# Patient Record
Sex: FEMALE | Race: WHITE | Employment: FULL TIME | URBAN - METROPOLITAN AREA
[De-identification: names, ages, dates, MRNs, and addresses within clinical notes are randomized per-mention and may not be internally consistent; named-entity substitution may affect disease eponyms.]

---

## 2020-02-17 NOTE — PROGRESS NOTES
Chief Complaint No chief complaint on file. [facility] Katty Almaguer is a 20 y.o. female who presents for the evaluation of ***. No LMP recorded. The patient complains of ***. It is located ***. The symptoms are ***. They started *** ago. Since then they have become ***. Associated symptoms: ***. Aggravating symptoms: ***. Alleviating factors: ***. The patient denies ***. No past medical history on file. No past surgical history on file. Social History Occupational History  Not on file Tobacco Use  Smoking status: Not on file Substance and Sexual Activity  Alcohol use: Not on file  Drug use: Not on file  Sexual activity: Not on file No family history on file. Allergies not on file Prior to Admission medications Not on File Review of Systems: History obtained from the patient Constitutional: negative for weight loss, fever, night sweats HEENT: negative for hearing loss, earache, congestion, snoring, sorethroat CV: negative for chest pain, palpitations, edema Resp: negative for cough, shortness of breath, wheezing Breast: negative for breast lumps, nipple discharge, galactorrhea GI: negative for change in bowel habits, abdominal pain, black or bloody stools : negative for frequency, dysuria, hematuria, vaginal discharge MSK: negative for back pain, joint pain, muscle pain Skin: negative for itching, rash, hives Neuro: negative for dizziness, headache, confusion, weakness Psych: negative for anxiety, depression, change in mood Heme/lymph: negative for bleeding, bruising, pallor Objective: There were no vitals taken for this visit. Physical Exam: PHYSICAL EXAMINATION Constitutional 
· Appearance: well-nourished, well developed, alert, in no acute distress HENT 
· Head and Face: appears normal 
 
Neck · Inspection/Palpation: normal appearance, no masses or tenderness · Lymph Nodes: no lymphadenopathy present · Thyroid: gland size normal, nontender, no nodules or masses present on palpation Chest 
· Respiratory Effort: breathing labored · Auscultation: normal breath sounds Cardiovascular · Heart: 
· Auscultation: regular rate and rhythm without murmur Breasts · Inspection of Breasts: breasts symmetrical, no skin changes, no discharge present, nipple appearance normal, no skin retraction present · Palpation of Breasts and Axillae: no masses present on palpation, no breast tenderness · Axillary Lymph Nodes: no lymphadenopathy present Gastrointestinal 
· Abdominal Examination: abdomen non-tender to palpation, normal bowel sounds, no masses present · Liver and spleen: no hepatomegaly present, spleen not palpable · Hernias: no hernias identified Genitourinary · External Genitalia: normal appearance for age, no discharge present, no tenderness present, no inflammatory lesions present, no masses present, no atrophy present · Vagina: normal vaginal vault without central or paravaginal defects, no discharge present, no inflammatory lesions present, no masses present · Bladder: non-tender to palpation · Urethra: appears normal 
· Cervix: normal  
· Uterus: normal size, shape and consistency · Adnexa: no adnexal tenderness present, no adnexal masses present · Perineum: perineum within normal limits, no evidence of trauma, no rashes or skin lesions present · Anus: anus within normal limits, no hemorrhoids present · Inguinal Lymph Nodes: no lymphadenopathy present Skin · General Inspection: no rash, no lesions identified Neurologic/Psychiatric · Mental Status: · Orientation: grossly oriented to person, place and time · Mood and Affect: mood normal, affect appropriate Assessment:  
 
 
Plan:  
 
 
 
RTO prn if symptoms persist or worsen. Instructions given to pt. Handouts given to pt.

## 2020-02-18 ENCOUNTER — OFFICE VISIT (OUTPATIENT)
Dept: OBGYN CLINIC | Age: 20
End: 2020-02-18

## 2020-02-18 ENCOUNTER — PATIENT MESSAGE (OUTPATIENT)
Dept: OBGYN CLINIC | Age: 20
End: 2020-02-18

## 2020-02-18 VITALS — DIASTOLIC BLOOD PRESSURE: 80 MMHG | SYSTOLIC BLOOD PRESSURE: 124 MMHG | WEIGHT: 187 LBS

## 2020-02-18 DIAGNOSIS — N93.9 ABNORMAL UTERINE BLEEDING (AUB): Primary | ICD-10-CM

## 2020-02-18 RX ORDER — NORGESTIMATE AND ETHINYL ESTRADIOL 0.25-0.035
1 KIT ORAL DAILY
COMMUNITY
End: 2020-11-17 | Stop reason: SDUPTHER
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
## 2020-02-19 LAB
ERYTHROCYTE [DISTWIDTH] IN BLOOD BY AUTOMATED COUNT: 12.9 % (ref 11.7–15.4)
FERRITIN SERPL-MCNC: 7 NG/ML (ref 15–150)
HCG INTACT+B SERPL-ACNC: <1 MIU/ML
HCT VFR BLD AUTO: 36.7 % (ref 34–46.6)
HGB BLD-MCNC: 11.7 G/DL (ref 11.1–15.9)
IRON SATN MFR SERPL: 5 % (ref 15–55)
IRON SERPL-MCNC: 19 UG/DL (ref 27–159)
MCH RBC QN AUTO: 26.7 PG (ref 26.6–33)
MCHC RBC AUTO-ENTMCNC: 31.9 G/DL (ref 31.5–35.7)
MCV RBC AUTO: 84 FL (ref 79–97)
PLATELET # BLD AUTO: 316 X10E3/UL (ref 150–450)
RBC # BLD AUTO: 4.39 X10E6/UL (ref 3.77–5.28)
TIBC SERPL-MCNC: 395 UG/DL (ref 250–450)
TSH SERPL DL<=0.005 MIU/L-ACNC: 1.45 UIU/ML (ref 0.45–4.5)
UIBC SERPL-MCNC: 376 UG/DL (ref 131–425)
WBC # BLD AUTO: 7.5 X10E3/UL (ref 3.4–10.8)

## 2020-02-24 LAB
C TRACH RRNA SPEC QL NAA+PROBE: NEGATIVE
N GONORRHOEA RRNA SPEC QL NAA+PROBE: NEGATIVE
T VAGINALIS DNA SPEC QL NAA+PROBE: NEGATIVE

## 2020-02-24 NOTE — PROGRESS NOTES
Chief Complaint   Follow Up Appointment (AUB)      HPI  Deerk Ramachandran is a 20 y.o. female who presents for the evaluation of abnormal uterine bleeding. Patient's last menstrual period was 02/22/2020. On Sprintec. Seen for first visit 2/18/20. Given Menstrual calendar, did not bring with her today. On placebo week with current pill pack. Period-like bleeding started on Saturday 2/22, last day of active pills. Thought period had stopped, but some spotting today. Due to start new pack this weekend. Labs drawn last visit:  Results for orders placed or performed in visit on 02/18/20   TSH 3RD GENERATION   Result Value Ref Range    TSH 1.450 0.450 - 4.500 uIU/mL   CBC W/O DIFF   Result Value Ref Range    WBC 7.5 3.4 - 10.8 x10E3/uL    RBC 4.39 3.77 - 5.28 x10E6/uL    HGB 11.7 11.1 - 15.9 g/dL    HCT 36.7 34.0 - 46.6 %    MCV 84 79 - 97 fL    MCH 26.7 26.6 - 33.0 pg    MCHC 31.9 31.5 - 35.7 g/dL    RDW 12.9 11.7 - 15.4 %    PLATELET 042 254 - 879 x10E3/uL   FERRITIN   Result Value Ref Range    Ferritin 7 (L) 15 - 150 ng/mL   IRON PROFILE   Result Value Ref Range    TIBC 395 250 - 450 ug/dL    UIBC 376 131 - 425 ug/dL    Iron 19 (L) 27 - 159 ug/dL    Iron % saturation 5 (LL) 15 - 55 %   BETA HCG, QT   Result Value Ref Range    HCG, beta, QT <1 mIU/mL   CT/NG/T.VAGINALIS AMPLIFICATION   Result Value Ref Range    C. trachomatis by LA Negative Negative    N. gonorrhoeae by LA Negative Negative    T. vaginalis by LA Negative Negative         US today shows focal density, ?polyp, otherwise nl US with thin endometrium. TRANSVAGINAL ULTRASOUND PERFORMED  UTERUS IS ANTEVERTED, NORMAL IN SIZE AND ECHOGENICITY. ENDOMETRIUM MEASURES 4MM IN THICKNESS. A 8MM X 5MM ECHOGENIC FOCI IS NOTED IN THE  ENDOMETRIUM. BLOOD FLOW CAN NOT BE SEEN TO THIS FOCI. A POLYP CAN NOT BE RULED OUT. BILATERAL FOLLICULAR CYSTS ALL <2CW. RIGHT OVARY APPEARS WITHIN NORMAL LIMITS. LEFT OVARY APPEARS WITHIN NORMAL LIMITS.   SCANT FREE FLUID SEEN IN THE CDS. Past Medical History:   Diagnosis Date    ADD (attention deficit disorder)     Depression with anxiety      No past surgical history on file. Social History     Occupational History    Not on file   Tobacco Use    Smoking status: Not on file   Substance and Sexual Activity    Alcohol use: Not on file    Drug use: Not on file    Sexual activity: Not on file     Family History   Problem Relation Age of Onset    Hypertension Father     Heart Disease Maternal Grandmother     Kidney Disease Maternal Grandmother     Uterine Cancer Maternal Grandmother     Hypertension Maternal Grandmother     Heart Disease Maternal Grandfather     Hypertension Maternal Grandfather     Stroke Maternal Grandfather     Hypertension Paternal Grandmother     Heart Attack Paternal Grandfather                Allergies   Allergen Reactions    Clindamycin Hives    Penicillins Hives     Prior to Admission medications    Medication Sig Start Date End Date Taking? Authorizing Provider   norgestimate-ethinyl estradioL ([id number] [facility], ,) 0.25-35 mg-mcg tab Take 1 Tab by mouth daily.     Provider, Historical            Objective:  Visit Vitals  /71 (BP 1 Location: Right arm, BP Patient Position: Sitting)   Ht 5' 4\" (1.626 m)   Wt 187 lb (84.8 kg)   LMP 2020   BMI 32.10 kg/m²       Physical Exam:   PHYSICAL EXAMINATION    Constitutional  · Appearance: well-nourished, well developed, alert, in no acute distress    HENT  · Head and Face: appears normal      Skin  · General Inspection: no rash, no lesions identified    Neurologic/Psychiatric  · Mental Status:  · Orientation: grossly oriented to person, place and time  · Mood and Affect: mood normal, affect appropriate    Assessment:   BTB on OCPs  ?endometrial polyp on US    Plan:   SIS  Enc to track bleeding on menstrual calendar

## 2020-02-24 NOTE — TELEPHONE ENCOUNTER
----- Message from Tank Pineda MD sent at 2/23/2020 10:59 PM EST -----  Thyroid is normal.  You are not anemic, but your iron levels are a little low. Please take any over the counter iron supplement once a day or every other day. Notify pt.

## 2020-02-26 ENCOUNTER — OFFICE VISIT (OUTPATIENT)
Dept: OBGYN CLINIC | Age: 20
End: 2020-02-26

## 2020-02-26 VITALS
BODY MASS INDEX: 31.92 KG/M2 | HEIGHT: 64 IN | SYSTOLIC BLOOD PRESSURE: 122 MMHG | DIASTOLIC BLOOD PRESSURE: 71 MMHG | WEIGHT: 187 LBS

## 2020-02-26 DIAGNOSIS — R93.5 ABNORMAL ENDOMETRIAL ULTRASOUND: ICD-10-CM

## 2020-02-26 DIAGNOSIS — N92.1 BREAKTHROUGH BLEEDING ON OCPS: Primary | ICD-10-CM

## 2020-03-03 ENCOUNTER — OFFICE VISIT (OUTPATIENT)
Dept: OBGYN CLINIC | Age: 20
End: 2020-03-03

## 2020-03-03 DIAGNOSIS — N92.1 BREAKTHROUGH BLEEDING ON OCPS: Primary | ICD-10-CM

## 2020-03-03 NOTE — PROGRESS NOTES
Abnormal bleeding note      Lynette Cerda is a 20 y.o. female who complains of vaginal bleeding problems. BTB on Sprintec    Her current method of family planning is OCP (estrogen/progesterone). She developed this problem approximately 2 months ago. Seen for initial visit on 20. Reported irregular bleeding throughout :  -10  1/20---    She has had vaginal bleeding which she describes as heavy, irregular lasting up to several days. Pad or tampon count: changes every 1-2 hours. Reports regular cycles prior to January. Seen  for US. Endometrium with focal irregularity, cannot r/o polyp. Was on placebo week at that visit. Reported period started -. Returns today for SIS -> wnl (see separate note below)    Had reported taking pills consistently. However, on further questioning today, states she started pills on Saturday because it is a [employer]. Does not consistently start new pack every 28 days. Will vary start date depending on when her period finishes. Her relevant past medical history:   Past Medical History:   Diagnosis Date    ADD (attention deficit disorder)     Depression with anxiety         No past surgical history on file.   Social History     Occupational History    Not on file   Tobacco Use    Smoking status: Not on file   Substance and Sexual Activity    Alcohol use: Not on file    Drug use: Not on file    Sexual activity: Not on file     Family History   Problem Relation Age of Onset    Hypertension Father     Heart Disease Maternal Grandmother     Kidney Disease Maternal Grandmother     Uterine Cancer Maternal Grandmother     Hypertension Maternal Grandmother     Heart Disease Maternal Grandfather     Hypertension Maternal Grandfather     Stroke Maternal Grandfather     Hypertension Paternal Grandmother     Heart Attack Paternal Grandfather                Allergies   Allergen Reactions  Clindamycin Hives    Penicillins Hives     Prior to Admission medications    Medication Sig Start Date End Date Taking? Authorizing Provider   norgestimate-ethinyl estradioL ([id number], ,) 0.25-35 mg-mcg tab Take 1 Tab by mouth daily. Provider, Historical        Review of Systems - History obtained from the patient  Constitutional: negative for weight loss, fever, night sweats  HEENT: negative for hearing loss, earache, congestion, snoring, sorethroat  CV: negative for chest pain, palpitations, edema  Resp: negative for cough, shortness of breath, wheezing  Breast: negative for breast lumps, nipple discharge, galactorrhea  GI: negative for change in bowel habits, abdominal pain, black or bloody stools  : negative for frequency, dysuria, hematuria  MSK: negative for back pain, joint pain, muscle pain  Skin: negative for itching, rash, hives  Neuro: negative for dizziness, headache, confusion, weakness  Psych: negative for anxiety, depression, change in mood  Heme/lymph: negative for bleeding, bruising, pallor      Objective:    Visit Vitals  LMP 02/22/2020          PHYSICAL EXAMINATION    Constitutional  · Appearance: well-nourished, well developed, alert, in no acute distress    HENT  · Head and Face: appears normal        Skin  · General Inspection: no rash, no lesions identified    Neurologic/Psychiatric  · Mental Status:  · Orientation: grossly oriented to person, place and time  · Mood and Affect: mood normal, affect appropriate    Assessment:   BTB on OCPs. ? polyp on previous US -> SIS nl today. On further questioning, sounds like she has not been starting her new packs appropriately    Plan:   Reviewed proper way to take pills. Continue menstrual calendar  RTO 3-4 months for AE, will reassess bleeding pattern at that visit. Instructions given to pt. Handouts given to pt.                       [facility]  OFFICE PROCEDURE PROGRESS NOTE        Chart reviewed for the following:   Toni Davis MD, have reviewed the History, Physical and updated the Allergic reactions for [address] performed immediately prior to start of procedure:   Toni Davis MD, have performed the following reviews on [facility] prior to the start of the procedure:            * Patient was identified by name and date of birth   * Agreement on procedure being performed was verified  * Risks and Benefits explained to the patient  * Procedure site verified and marked as necessary  * Patient was positioned for comfort  * Consent was signed and verified     Time: 1125      Date of procedure: 3/3/2020    Procedure performed by:  Michael Reno MD    Provider assisted by: Yasir Zuluaga MA    Patient assisted by: self    How tolerated by patient: tolerated the procedure well with no complications    Post Procedural Pain Scale: 2 - Hurts Little Bit    Comments: none          SONOHYSTEROGRAPHY    [facility] is a No obstetric history on file. ,  20 y.o. female [facility] whose Patient's last menstrual period was 02/22/2020. was on . , presents for a sonohysterography. The indications for this procedure were reviewed with the patient. The procedure was explained in detail and all questions were answered. Procedure: The patient was placed in the lithotomy position. A graves speculum was introduced into the vagina and the cervix was visualized. The cervix was prepped with zephiran solution. A Cook's Hysterography catheter was then introduced into the uterine cavity and the speculum was removed. Sterile sonohysterography with 3D Reconstruction was performed. The endometrial cavity was distended with sterile saline. ~5-7cc used  The findings are as follows: normal cavity with no lesions. The patient tolerated the procedure well without complication, and was discharged to home.

## 2020-06-01 ENCOUNTER — OFFICE VISIT (OUTPATIENT)
Dept: INTERNAL MEDICINE CLINIC | Age: 20
End: 2020-06-01

## 2020-06-01 VITALS
HEART RATE: 83 BPM | RESPIRATION RATE: 12 BRPM | DIASTOLIC BLOOD PRESSURE: 77 MMHG | OXYGEN SATURATION: 99 % | SYSTOLIC BLOOD PRESSURE: 122 MMHG | HEIGHT: 64 IN | BODY MASS INDEX: 31.24 KG/M2 | WEIGHT: 183 LBS | TEMPERATURE: 98.1 F

## 2020-06-01 DIAGNOSIS — Z00.00 ROUTINE ADULT HEALTH MAINTENANCE: ICD-10-CM

## 2020-06-01 DIAGNOSIS — G47.01 INSOMNIA DUE TO MEDICAL CONDITION: ICD-10-CM

## 2020-06-01 DIAGNOSIS — F41.9 ANXIETY: Primary | ICD-10-CM

## 2020-06-01 PROBLEM — E61.1 IRON DEFICIENCY: Status: ACTIVE | Noted: 2020-06-01

## 2020-06-01 PROBLEM — F33.40 RECURRENT MAJOR DEPRESSIVE DISORDER, IN REMISSION (HCC): Status: ACTIVE | Noted: 2020-06-01

## 2020-06-01 RX ORDER — TRAZODONE HYDROCHLORIDE 50 MG/1
50 TABLET ORAL
Qty: 30 TAB | Refills: 2 | Status: SHIPPED | OUTPATIENT
Start: 2020-06-01 | End: 2020-08-24 | Stop reason: SDUPTHER

## 2020-06-01 RX ORDER — LANOLIN ALCOHOL/MO/W.PET/CERES
CREAM (GRAM) TOPICAL
COMMUNITY
End: 2020-11-17

## 2020-06-01 NOTE — PROGRESS NOTES
Assessment and Plan   Diagnoses and all orders for this visit:    1. Anxiety  2. Insomnia due to medical condition  -     traZODone (DESYREL) 50 mg tablet; Take 1 Tab by mouth nightly.  -     REFERRAL TO BEHAVIORAL HEALTH  Symptoms mostly worse at night when she wakes up in the middle of the night. Will trial trazodone. Can take up to 2 tablets at night. Also recommend therapy to discuss other behavioral modifications. Discussed sleep hygiene    3. Routine adult health maintenance  Reports getting all of her vaccines when she was younger. Benefits, risks, possible drug interactions, and side effects of all new medications were reviewed with the patient. Pt verbalized understanding. Return to clinic: 3 to 6 months for anxiety/insomnia, Pap    Amy Mosley MD  [facility]  6/1/2020    Future Appointments   Date Time Provider Celia Dora   7/6/2020 11:00 AM Gilbert Taylor MD [address] [place] [address]        History of Present Illness   Chief Complaint   Anxiety    Rick Wagner is a 20 y.o. female     Previously followed by pediatrician    Anxiety, insomnia, depression previously diagnosed with depression and anxiety and was on medications however was having side effects so she stopped. Reports depression has been well controlled but her anxiety has gotten worse over the past couple months. Reports that she wakes up at night and is unable to go back to sleep. Unable to shut off her thoughts at night. She wakes up afraid of family members dying. She is able to fall asleep okay but when she wakes up, she cannot fall asleep again. Wakes up around 3:00. Feels she is able to cope with her anxiety better during the day and is mostly an issue at night when she wakes up. Iron deficiency anemiahas been taking iron supplements    Review of Systems   Constitutional: Negative for chills and fever. HENT: Negative for hearing loss.     Eyes: Negative for blurred vision. Respiratory: Negative for shortness of breath. Cardiovascular: Negative for chest pain. Gastrointestinal: Negative for abdominal pain, blood in stool, constipation, diarrhea, melena, nausea and vomiting. Genitourinary: Negative for dysuria and hematuria. Musculoskeletal: Negative for joint pain. Skin: Negative for rash. Neurological: Negative for headaches. Past Medical History     Allergies   Allergen Reactions    Clindamycin Hives    Penicillins Hives        Current Outpatient Medications   Medication Sig    traZODone (DESYREL) 50 mg tablet Take 1 Tab by mouth nightly.  ferrous sulfate 325 mg (65 mg iron) tablet Take  by mouth Daily (before breakfast).  norgestimate-ethinyl estradioL ([facility], 28,) 0.25-35 mg-mcg tab Take 1 Tab by mouth daily. No current facility-administered medications for this visit. Patient Active Problem List   Diagnosis Code    Depression with anxiety F41.8    ADD (attention deficit disorder) F98.8    Anxiety F41.9     History reviewed. No pertinent surgical history.    Social History     Tobacco Use    Smoking status: Never Smoker    Smokeless tobacco: Former User    Tobacco comment: vaping   Substance Use Topics    Alcohol use: Never     Frequency: Never      Family History   Problem Relation Age of Onset    Hypertension Father     Heart Disease Maternal Grandmother     Kidney Disease Maternal Grandmother     Uterine Cancer Maternal Grandmother     Hypertension Maternal Grandmother     Heart Disease Maternal Grandfather     Hypertension Maternal Grandfather     Stroke Maternal Grandfather     Hypertension Paternal Grandmother     Heart Attack Paternal Grandfather                 Physical Exam   Vitals:       Visit Vitals  /77 (BP 1 Location: Right arm, BP Patient Position: Sitting)   Pulse 83   Temp 98.1 °F (36.7 °C) (Oral)   Resp 12   Ht 5' 4\" (1.626 m)   Wt 183 lb (83 kg)   LMP 2020 (Approximate) SpO2 99%   BMI 31.41 kg/m²        Physical Exam  Constitutional:       General: She is not in acute distress. Appearance: She is well-developed. HENT:      Right Ear: Tympanic membrane, ear canal and external ear normal.      Left Ear: Tympanic membrane, ear canal and external ear normal.      Nose: Nose normal.   Eyes:      Conjunctiva/sclera: Conjunctivae normal.      Pupils: Pupils are equal, round, and reactive to light. Neck:      Musculoskeletal: Neck supple. Cardiovascular:      Rate and Rhythm: Normal rate and regular rhythm. Pulses: Normal pulses. Heart sounds: No murmur. No friction rub. No gallop. Pulmonary:      Effort: No respiratory distress. Breath sounds: No wheezing or rales. Abdominal:      General: Bowel sounds are normal. There is no distension. Palpations: Abdomen is soft. There is no hepatomegaly, splenomegaly or mass. Tenderness: There is no abdominal tenderness. Hernia: No hernia is present. Skin:     General: Skin is warm. Findings: No rash. Neurological:      Mental Status: She is alert and oriented to person, place, and time. Gait: Gait normal.   Psychiatric:         Mood and Affect: Mood normal.         Thought Content:  Thought content normal.         Judgment: Judgment normal.          Voice recognition software is utilized and this note may contain transcription errors

## 2020-06-18 ENCOUNTER — DOCUMENTATION ONLY (OUTPATIENT)
Dept: INTERNAL MEDICINE CLINIC | Age: 20
End: 2020-06-18

## 2020-06-18 ENCOUNTER — VIRTUAL VISIT (OUTPATIENT)
Dept: INTERNAL MEDICINE CLINIC | Age: 20
End: 2020-06-18

## 2020-06-18 DIAGNOSIS — F41.1 GENERALIZED ANXIETY DISORDER: Primary | ICD-10-CM

## 2020-06-18 NOTE — PROGRESS NOTES
[facility],    It was good to meet with you today! Rocky attached some information for you to look over:  - A breathing exercise  - The Mood Disorder Questionnaire    Here is a link to an excellent article on ADHD and the brain:  Sukhbe    Here is a link for a 3 minute moving meditation:  [email or web address]  And here is the link to the mindfulness website I like with many free videos:  [employer].gl    Below is an explanation of how ADHD/Anxiety/Bipolar can have similar symptoms, which is why I want to check for bipolar symptoms with the Mood Disorder Questionnaire, just in case:  Bipolar disorder is characterized by high, euphoric, or irritable periods called rena and low periods of depression. The rena stage is sometimes mistaken for hyperactivity and the low states manifest themselves as inattention and lack of motivation, which are common in individuals with ADHD. Depressive Symptoms  The depressive symptoms of bipolar disorder include:   Very low mood state   A loss of interest in things that previously gave pleasure   Dysregulation of appetite (either increase or decrease)   Significant weight loss or gain   Change in sleeping habits (sleeping much more than normal or sleeping too little)   Altered physical agitation rate (slowing down or becoming more anxious)   Feelings of fatigue, worthlessness, or inappropriate guilt   Difficulty concentrating   Recurrent thoughts of death or suicide  How Depressive Symptoms Resemble ADHD  The ADHD brain produces an insufficient amount of dopamine, the neurotransmitter that helps control to brains reward and pleasure centers. As such, it naturally craves more dopamine, which sugar and carbohydrates deliver in spades.  Feeding these cravings may result in the appetite changes, weight gain, and sleep problems described above. In addition, people with ADHD report frequent sleep disturbances and problems falling asleep due to a racing brain. Inattentiveness and difficulty concentrating may cause fatigue, especially among students and full-time employees working long days. Distractibility and poor focus can cause people with ADHD to quickly lose interest in activities or objects that once gave them pleasure as well. The principle difference is that ADHD symptoms are contextual and situational, whereas bipolar symptoms are not triggered by any external factors. In a stimulating and positive environment, people with ADHD remain in a good internal state. If the environment is not stimulating enough, they can become bored or agitated. People with ADHD always experience life in this manner. Their sensibility and orientation toward the world is the same, even when they move through mood states of different intensities. Patients with ADHD can more often control their feelings by changing their environment and stimuli. Depressive bipolar symptoms, on the other hand, can feel like a dark cloud emerging from an internal emotional state. No situational reasons trigger these feelings of depression; patients just wake up feeling fundamentally different when depressed and not depressed. Manic Symptoms  Viry is a severe change in mood during which a person with bipolar disorder is either extremely irritable or overly silly/elated.  Manic symptoms include:   [place]-inflated self-esteem, grandiosity   Increased, revved-up energy   Decreased need for sleep for up to a week without feeling tired   Extreme random distractibility   Racing thoughts   Increased monetary spending   Extreme irritability/inability to settle down   Talking too much or too fast, changing topics quickly   Unusual hypersexuality   Increased goal-directed activity   Disregard of risk, excessive involvement in risky behaviors or illegal activities  How Manic Symptoms Resemble ADHD  One hallmark byproduct of ADHD is hyperfocus, or the ability to focus intently on something of great personal interest for an extended period of time, at times mentally drowning out the world around. This may happen on deadline pressure or when wrapped up in a compelling project, book, or video game. Hyperfocus may cause a decreased need for sleep and may look like increased goal-directed activity, however this is short-lived in people with ADHD, who often feel exhausted once the hyperfocus fades. A manic episode, on the other hand, is independent of external circumstances. People with bipolar disorder often want to go to sleep or relax, but describe feeling as if there is electricity going through their bodies that they cannot stop or dampen, no matter how desperate they are for sleep. This inability to settle the mind and body can go on for a week. Going without sleep for long periods of time can trigger psychotic episodes or hallucinations. People with ADHD often interrupt or talk too much without noticing because they miss social cues or because they lose focus on the threads of a conversation. Patients experiencing a manic bipolar episode are often aware they are changing topics quickly and sometimes randomly, but they feel powerless to stop or understand their quickly moving thoughts. This type of behavior is uncharacteristic and does not reflect how they would typically converse. People with ADHD report racing thoughts, which they can grasp and appreciate but cant necessarily express or record quickly enough. With rena, the patients racing thoughts flash by like a flock of birds overtaking them so fast that their color and type is impossible to discern. These distracting and disconcerting racing thoughts are often mistaken for ADHD, though they are quite different in nature. During a manic episode, a patient can engage in risky sexual or illegal behavior. They may also become incredibly productive for a few days, before exhaustion causes them to physically and mentally crash. Bipolar II/Hypomanic Symptoms  Bipolar II affects 1-2 percent of the population and its symptoms include:   Hypomanic episodes marked by anxiety   Rejection sensitivity  The hypomania experienced by people with bipolar II is less intense than other manic episodes, and is easily mistaken for anxiety. During these episodes, patients feel revved up, or like their thinking is different than it normally is, which often resembles regular hyperactivity. People with bipolar II have a high rejection sensitivity, a trait common to people with ADHD as well. In addition, people with bipolar II experience more depressive episodes than do people with bipolar I disorder. Bipolar II is more common in women, and can be misdiagnosed as major depressive disorder because the rena is less severe. There is also a type of bipolar spectrum disorder that doesnt fit neatly into manic or depressive episodes called NOS (not otherwise specified). In the course of a day, people with NOS might experience both rena and depressive symptoms. Its a lot of info, so just take it at your own pace! Let me know if you have any questions.       Jolynn Bradford
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
## 2020-07-02 ENCOUNTER — VIRTUAL VISIT (OUTPATIENT)
Dept: INTERNAL MEDICINE CLINIC | Age: 20
End: 2020-07-02

## 2020-07-02 DIAGNOSIS — F41.1 GENERALIZED ANXIETY DISORDER: Primary | ICD-10-CM

## 2020-07-02 NOTE — PROGRESS NOTES
Behavioral Health Progress Note    Date :  07/02/20   Name: Lisbeth Forte   Session Length: 30  Type of visit:  Virtual, with pt at home and [facility] in home office. MENTAL STATUS:  Lexie presents with bright affect, said things are going well. She has been staying busy, making herself get up and do things, less \"in her head,\" and feeling better. Mood has improved, less anxious, decreased angry outbursts. RISK ASSESSMENT:   Patient denies Suicidal Ideation/Homicidal Ideation/Self-Injury Behavior. ASSESSMENT AND INTERVENTION:    Pt has been going to the gym with friends, talking more with her friends and her family, and feeling better. She picked her classes for Fall semester, continues to work, and in general has been more active which always makes her feel better. Feels like she has a schedule and purpose to her days. Talked about having the ability to self-schedule, which is important. She has been journaling and doing yoga with her friends, will go to the beach for a few days with family. Reviewed MDQ, pt answered yes to 7 out of 13 to Part A, which is a positive result, however, they do not happen at the same time, causes only a moderate problem. At current time pt does not meet criteria for bipolar, her symptoms seems more influenced by her ADHD and by what pt is thinking about at the time. Pt is more active now, which is good, but has not really changed her thinking as much. But she is feeling better, so decided to end sessions and she will call as needed. PLAN:    Pt will continue with her activities and self-care, will call if she feels she needs help.

## 2020-08-24 DIAGNOSIS — G47.01 INSOMNIA DUE TO MEDICAL CONDITION: ICD-10-CM

## 2020-08-24 RX ORDER — TRAZODONE HYDROCHLORIDE 50 MG/1
50 TABLET ORAL
Qty: 30 TAB | Refills: 2 | Status: SHIPPED | OUTPATIENT
Start: 2020-08-24 | End: 2020-12-20

## 2020-11-11 NOTE — PROGRESS NOTES
Assessment and Plan   Diagnoses and all orders for this visit:    1. Attention deficit hyperactivity disorder (ADHD), combined type  -     lisdexamfetamine (Vyvanse) 30 mg capsule; Take 1 Cap by mouth every morning. Max Daily Amount: 30 mg. Indications: attention deficit disorder with hyperactivity  -     lisdexamfetamine (Vyvanse) 30 mg capsule; Take 1 Cap by mouth every morning. Max Daily Amount: 30 mg. Indications: attention deficit disorder with hyperactivity  Diagnosed with ADHD has a child and has been on medication since age 5. Previously on Adderall but she did not like how she felt on it. Subsequently on Concerta but that made her anxiety worse. She is currently going through online school and has a lot of distractions at home which is making it more difficult for her to concentrate. She is interested in starting medications again for ADHD. We will try Vyvanse. Advised to call if she has any issues with this medication. We will follow-up in a month    2. Anxiety  3. Insomnia due to medical condition  Trazodone 50 mg at night working well for her and symptoms are better. Benefits, risks, possible drug interactions, and side effects of all new medications were reviewed with the patient. Pt verbalized understanding. Return to clinic: 1 month for medication follow-up,     Nidhi Ramos MD  [facility]  11/12/2020    Future Appointments   Date Time Provider Celia Nievesi   11/17/2020 10:20 AM Candy Casper MD [facility] BS AMB   [id number]/33/[id number]  0:06 AM Estee Coy MD [facility] BS AMB        Subjective   Chief Complaint   ADHD    Albertina Rutledge is a 23 y.o. female         Review of Systems   Constitutional: Negative for chills and fever. Respiratory: Negative for shortness of breath. Cardiovascular: Negative for chest pain.         Objective   Vitals:       Visit Vitals  /76 (BP 1 Location: Left arm, BP Patient Position: Sitting) Pulse 78   Temp 98.3 °F (36.8 °C) (Oral)   Resp 12   Ht 5' 4\" (1.626 m)   Wt 177 lb 6.4 oz (80.5 kg)   LMP 10/12/2020 (Approximate)   SpO2 98%   BMI 30.45 kg/m²        Physical Exam  Constitutional:       Appearance: Normal appearance. She is not ill-appearing. Cardiovascular:      Rate and Rhythm: Normal rate and regular rhythm. Heart sounds: No murmur. No friction rub. No gallop. Pulmonary:      Effort: No respiratory distress. Breath sounds: No wheezing, rhonchi or rales. Neurological:      Mental Status: She is alert. Gait: Gait normal.   Psychiatric:         Mood and Affect: Mood normal.         Thought Content: Thought content normal.         Judgment: Judgment normal.          Current Outpatient Medications   Medication Sig    lisdexamfetamine (Vyvanse) 30 mg capsule Take 1 Cap by mouth every morning. Max Daily Amount: 30 mg. Indications: attention deficit disorder with hyperactivity    [START ON 12/12/2020] lisdexamfetamine (Vyvanse) 30 mg capsule Take 1 Cap by mouth every morning. Max Daily Amount: 30 mg. Indications: attention deficit disorder with hyperactivity    traZODone (DESYREL) 50 mg tablet Take 1 Tab by mouth nightly.  norgestimate-ethinyl estradioL ([facility]PRINT[facility], 28,) 0.25-35 mg-mcg tab Take 1 Tab by mouth daily.  ferrous sulfate 325 mg (65 mg iron) tablet Take  by mouth Daily (before breakfast). Indications: Not Taking     No current facility-administered medications for this visit.

## 2020-11-12 ENCOUNTER — OFFICE VISIT (OUTPATIENT)
Dept: INTERNAL MEDICINE CLINIC | Age: 20
End: 2020-11-12

## 2020-11-12 VITALS
HEIGHT: 64 IN | OXYGEN SATURATION: 98 % | DIASTOLIC BLOOD PRESSURE: 76 MMHG | SYSTOLIC BLOOD PRESSURE: 126 MMHG | RESPIRATION RATE: 12 BRPM | TEMPERATURE: 98.3 F | HEART RATE: 78 BPM | WEIGHT: 177.4 LBS | BODY MASS INDEX: 30.29 KG/M2

## 2020-11-12 DIAGNOSIS — G47.01 INSOMNIA DUE TO MEDICAL CONDITION: ICD-10-CM

## 2020-11-12 DIAGNOSIS — F90.2 ATTENTION DEFICIT HYPERACTIVITY DISORDER (ADHD), COMBINED TYPE: Primary | ICD-10-CM

## 2020-11-12 DIAGNOSIS — F41.9 ANXIETY: ICD-10-CM

## 2020-11-12 PROCEDURE — 99214 OFFICE O/P EST MOD 30 MIN: CPT | Performed by: INTERNAL MEDICINE

## 2020-11-16 NOTE — PROGRESS NOTES
Annual exam ages 20-44    Maylin Wing is a No obstetric history on file. ,  23 y.o. female [facility] [place] Patient's last menstrual period was 11/12/2020., who presents for her annual checkup. LV=3/3/20 f/u OCPs  Taking classes online classes through [employer]. Was supposed to be studying abroad ([place], [facility]), canceled d/t COVID pandemic. Today is her first gyn exam.     Will be starting Vyvanse today. H/o AUB. Sprintec. At last visit in March, was not taking pills properly, reviewed correct timing. Has not had any BTB since then. ADDITIONAL CONCERNS:  She is having no significant problems. With regard to the Gardasil vaccine, she has received all 3 injections. Menstrual status:    Her periods are heavy in flow. She is using five to ten pads or tampons per day, usually regular and last 26-30 days. She denies dysmenorrhea. She denies premenstrual symptoms. Contraception:    The current method of family planning is OCP (estrogen/progesterone). Sexual history:     She  reports previously being sexually active and has had partner(s) who are Male. .        Pap and Mammogram History: Today is her first pap smear. The patient has never had a mammogram.    Breast Cancer History/Substance Abuse:    Past Medical History:   Diagnosis Date    ADD (attention deficit disorder)     Anxiety     Depression with anxiety      History reviewed. No pertinent surgical history. OB History   No obstetric history on file. Current Outpatient Medications   Medication Sig Dispense Refill    norgestimate-ethinyl estradioL (Sprintec, 28,) 0.25-35 mg-mcg tab Take 1 Tab by mouth daily. 3 Package 4    [START ON 12/12/2020] lisdexamfetamine (Vyvanse) 30 mg capsule Take 1 Cap by mouth every morning. Max Daily Amount: 30 mg. Indications: attention deficit disorder with hyperactivity 30 Cap 0    traZODone (DESYREL) 50 mg tablet Take 1 Tab by mouth nightly.  30 Tab 2    ferrous sulfate 325 mg (65 mg iron) tablet Take  by mouth Daily (before breakfast). Indications: Not Taking       Allergies: Clindamycin and Penicillins   Social History     Socioeconomic History    Marital status: SINGLE     Spouse name: Not on file    Number of children: Not on file    Years of education: Not on file    Highest education level: Not on file   Occupational History    Not on file   Social Needs    Financial resource strain: Not on file    Food insecurity     Worry: Not on file     Inability: Not on file    Transportation needs     Medical: Not on file     Non-medical: Not on file   Tobacco Use    Smoking status: Never Smoker    Smokeless tobacco: Former User    Tobacco comment: vaping   Substance and Sexual Activity    Alcohol use: Never     Frequency: Never    Drug use: Yes     Types: Marijuana     Comment: marijuana daily    Sexual activity: Not Currently     Partners: Male   Lifestyle    Physical activity     Days per week: Not on file     Minutes per session: Not on file    Stress: Not on file   Relationships    Social connections     Talks on phone: Not on file     Gets together: Not on file     Attends Latter day service: Not on file     Active member of club or organization: Not on file     Attends meetings of clubs or organizations: Not on file     Relationship status: Not on file    Intimate partner violence     Fear of current or ex partner: Not on file     Emotionally abused: Not on file     Physically abused: Not on file     Forced sexual activity: Not on file   Other Topics Concern    Not on file   Social History Narrative    Not on file     Tobacco History:  reports that she has never smoked. She quit smokeless tobacco use about a year ago. Alcohol Abuse:  reports no history of alcohol use. Drug Abuse:  reports current drug use. Drug: Marijuana.     Patient Active Problem List   Diagnosis Code    ADD (attention deficit disorder) F98.8    Anxiety F41.9    Recurrent major depressive disorder, in remission ([facility] F33.40    Iron deficiency E61.1     Family History   Problem Relation Age of Onset    Hypertension Father     Heart Disease Maternal Grandmother     Kidney Disease Maternal Grandmother     Uterine Cancer Maternal Grandmother     Hypertension Maternal Grandmother     Heart Disease Maternal Grandfather     Hypertension Maternal Grandfather     Stroke Maternal Grandfather     Hypertension Paternal Grandmother     Heart Attack Paternal Grandfather                Review of Systems - History obtained from the patient  Constitutional: negative for weight loss, fever, night sweats  HEENT: negative for hearing loss, earache, congestion, snoring, sorethroat  CV: negative for chest pain, palpitations, edema  Resp: negative for cough, shortness of breath, wheezing  GI: negative for change in bowel habits, abdominal pain, black or bloody stools  : negative for frequency, dysuria, hematuria, vaginal discharge  MSK: negative for back pain, joint pain, muscle pain  Breast: negative for breast lumps, nipple discharge, galactorrhea  Skin :negative for itching, rash, hives  Neuro: negative for dizziness, headache, confusion, weakness  Psych: negative for anxiety, depression, change in mood  Heme/lymph: negative for bleeding, bruising, pallor    Physical Exam    Visit Vitals  /80   Wt 180 lb (81.6 kg)   LMP 2020   BMI 30.90 kg/m²       Constitutional  · Appearance: well-nourished, well developed, alert, in no acute distress    HENT  · Head and Face: appears normal    Neck  · Inspection/Palpation: normal appearance, no masses or tenderness  · Lymph Nodes: no lymphadenopathy present  · Thyroid: gland size normal, nontender, no nodules or masses present on palpation    Chest  · Respiratory Effort: breathing unlabored  · Auscultation: normal breath sounds    Cardiovascular  · Heart:  · Auscultation: regular rate and rhythm without murmur    Breasts  · Inspection of Breasts: breasts symmetrical, no skin changes, no discharge present, nipple appearance normal, no skin retraction present  · Palpation of Breasts and Axillae: no masses present on palpation, no breast tenderness  · Axillary Lymph Nodes: no lymphadenopathy present    Gastrointestinal  · Abdominal Examination: abdomen non-tender to palpation, normal bowel sounds, no masses present  · Liver and spleen: no hepatomegaly present, spleen not palpable  · Hernias: no hernias identified    Genitourinary  · External Genitalia: normal appearance for age, no discharge present, no tenderness present, no inflammatory lesions present, no masses present, no atrophy present  · Vagina: normal vaginal vault without central or paravaginal defects, no discharge present, no inflammatory lesions present, no masses present; menstrual blood  · Bladder: non-tender to palpation  · Urethra: appears normal  · Cervix: normal   · Uterus: normal size, shape and consistency  · Adnexa: no adnexal tenderness present, no adnexal masses present  · Perineum: perineum within normal limits, no evidence of trauma, no rashes or skin lesions present  · Anus: anus within normal limits, no hemorrhoids present  · Inguinal Lymph Nodes: no lymphadenopathy present    Skin  · General Inspection: no rash, no lesions identified    Neurologic/Psychiatric  · Mental Status:  · Orientation: grossly oriented to person, place and time  · Mood and Affect: mood normal, affect appropriate            Assessment & Plan:  · Routine gynecologic examination. Pap today  · STI screening <26yo. · H/o AUB. Sprintec. Doing well. eRX #3 RFx4  · Her current medical status is satisfactory with no evidence of significant gynecologic issues.   · Counseled re: diet, exercise, healthy lifestyle  · Return for yearly wellness visits  · Gardisil completed  · Patient verbalized understanding       Orders Placed This Encounter    norgestimate-ethinyl estradioL ([address] [facility], ,) 0.25-35 mg-mcg tab Sig: Take 1 Tab by mouth daily. Dispense:  3 Package     Refill:  4    PAP(IMAGE GUIDED) CHLAMYDIA/GC,W/REFL HPV ALL PATHOLOGY([id number]) ([facility])     Order Specific Question:   Pap Source? Answer:   Cervical and Endocervical     Order Specific Question:   Total Hysterectomy? Answer:   No     Order Specific Question:   Supracervical Hysterectomy? Answer:   No     Order Specific Question:   Post Menopausal?     Answer:   No     Order Specific Question:   Hormone Therapy? Answer:   No     Order Specific Question:   IUD? Answer:   No     Order Specific Question:   Abnormal Bleeding? Answer:   No     Order Specific Question:   Pregnant     Answer:   No     Order Specific Question:   Post Partum? Answer:    No

## 2020-11-17 ENCOUNTER — OFFICE VISIT (OUTPATIENT)
Dept: OBGYN CLINIC | Age: 20
End: 2020-11-17
Payer: COMMERCIAL

## 2020-11-17 VITALS — SYSTOLIC BLOOD PRESSURE: 123 MMHG | BODY MASS INDEX: 30.9 KG/M2 | DIASTOLIC BLOOD PRESSURE: 80 MMHG | WEIGHT: 180 LBS

## 2020-11-17 DIAGNOSIS — Z01.419 ENCOUNTER FOR GYNECOLOGICAL EXAMINATION: Primary | ICD-10-CM

## 2020-11-17 PROCEDURE — 99395 PREV VISIT EST AGE 18-39: CPT | Performed by: OBSTETRICS & GYNECOLOGY

## 2020-11-17 RX ORDER — NORGESTIMATE AND ETHINYL ESTRADIOL 0.25-0.035
1 KIT ORAL DAILY
Qty: 3 PACKAGE | Refills: 4 | Status: SHIPPED | OUTPATIENT
Start: 2020-11-17 | End: 2021-11-19 | Stop reason: SDUPTHER

## 2020-11-26 LAB
C TRACH RRNA CVX QL NAA+PROBE: NEGATIVE
CYTOLOGIST CVX/VAG CYTO: NORMAL
CYTOLOGY CVX/VAG DOC CYTO: NORMAL
CYTOLOGY CVX/VAG DOC THIN PREP: NORMAL
DX ICD CODE: NORMAL
LABCORP, 190119: NORMAL
Lab: NORMAL
Lab: NORMAL
N GONORRHOEA RRNA CVX QL NAA+PROBE: NEGATIVE
OTHER STN SPEC: NORMAL
STAT OF ADQ CVX/VAG CYTO-IMP: NORMAL

## 2020-12-02 PROBLEM — Z12.4 ROUTINE PAPANICOLAOU SMEAR: Status: ACTIVE | Noted: 2020-12-02

## 2020-12-10 NOTE — PROGRESS NOTES
Assessment and Plan   Diagnoses and all orders for this visit:    1. Attention deficit hyperactivity disorder (ADHD), combined type  -     lisdexamfetamine (Vyvanse) 30 mg capsule; Take 1 Cap by mouth every morning. Max Daily Amount: 30 mg. Indications: attention deficit disorder with hyperactivity  -     lisdexamfetamine (Vyvanse) 30 mg capsule; Take 1 Cap by mouth every morning. Max Daily Amount: 30 mg. Indications: attention deficit disorder with hyperactivity  -     lisdexamfetamine (Vyvanse) 30 mg capsule; Take 1 Cap by mouth every morning. Max Daily Amount: 30 mg. Indications: attention deficit disorder with hyperactivity  Medication working well for her. Denies any worsening sleep issues, appetite issues, chest pain, palpitations. Says the medication has been helping her focus more. Continue current dose    2. Encounter for immunization  -     TETANUS, DIPHTHERIA TOXOIDS AND ACELLULAR PERTUSSIS VACCINE (TDAP), IN INDIVIDS. >=7, IM    3. Needs flu shot  -     INFLUENZA VIRUS VAC QUAD,SPLIT,PRESV FREE SYRINGE IM    We will check records to see if she had the HPV vaccine. Previously followed by [facility] pediatrics    Benefits, risks, possible drug interactions, and side effects of all new medications were reviewed with the patient. Pt verbalized understanding. Return to clinic: 6 months for medication follow-up    Mayelin Matos MD  [facility]  12/11/2020    Future Appointments   Date Time Provider Celia Nievesi   11/18/2021  9:00 AM Freeman Tucker MD [facility] BS AMB        Subjective   Chief Complaint   Medication follow-up    Ian Soto is a 23 y.o. female         Review of Systems   Constitutional: Negative for chills and fever. Respiratory: Negative for shortness of breath. Cardiovascular: Negative for chest pain.         Objective   Vitals:       Visit Vitals  /73 (BP 1 Location: Left arm, BP Patient Position: Sitting)   Pulse 84   Temp 97.8 °F (36.6 °C) (Oral)   Resp 16   Ht 5' 4\" (1.626 m)   Wt 183 lb (83 kg)   LMP 11/12/2020   SpO2 99%   BMI 31.41 kg/m²        Physical Exam  Constitutional:       Appearance: Normal appearance. She is not ill-appearing. Cardiovascular:      Rate and Rhythm: Normal rate. Pulmonary:      Effort: No respiratory distress. Neurological:      Mental Status: She is alert. Gait: Gait normal.   Psychiatric:         Mood and Affect: Mood normal.         Thought Content: Thought content normal.         Judgment: Judgment normal.          Current Outpatient Medications   Medication Sig    [START ON 1/12/2021] lisdexamfetamine (Vyvanse) 30 mg capsule Take 1 Cap by mouth every morning. Max Daily Amount: 30 mg. Indications: attention deficit disorder with hyperactivity    [START ON 2/12/2021] lisdexamfetamine (Vyvanse) 30 mg capsule Take 1 Cap by mouth every morning. Max Daily Amount: 30 mg. Indications: attention deficit disorder with hyperactivity    [START ON 3/12/2021] lisdexamfetamine (Vyvanse) 30 mg capsule Take 1 Cap by mouth every morning. Max Daily Amount: 30 mg. Indications: attention deficit disorder with hyperactivity    norgestimate-ethinyl estradioL (Sprintec, 28,) 0.25-35 mg-mcg tab Take 1 Tab by mouth daily.  traZODone (DESYREL) 50 mg tablet Take 1 Tab by mouth nightly. No current facility-administered medications for this visit.

## 2020-12-11 ENCOUNTER — TELEPHONE (OUTPATIENT)
Dept: INTERNAL MEDICINE CLINIC | Age: 20
End: 2020-12-11

## 2020-12-11 ENCOUNTER — OFFICE VISIT (OUTPATIENT)
Dept: INTERNAL MEDICINE CLINIC | Age: 20
End: 2020-12-11
Payer: COMMERCIAL

## 2020-12-11 VITALS
DIASTOLIC BLOOD PRESSURE: 73 MMHG | HEIGHT: 64 IN | BODY MASS INDEX: 31.24 KG/M2 | RESPIRATION RATE: 16 BRPM | OXYGEN SATURATION: 99 % | SYSTOLIC BLOOD PRESSURE: 111 MMHG | TEMPERATURE: 97.8 F | WEIGHT: 183 LBS | HEART RATE: 84 BPM

## 2020-12-11 DIAGNOSIS — Z23 NEEDS FLU SHOT: ICD-10-CM

## 2020-12-11 DIAGNOSIS — Z23 ENCOUNTER FOR IMMUNIZATION: ICD-10-CM

## 2020-12-11 DIAGNOSIS — F90.2 ATTENTION DEFICIT HYPERACTIVITY DISORDER (ADHD), COMBINED TYPE: Primary | ICD-10-CM

## 2020-12-11 PROCEDURE — 90715 TDAP VACCINE 7 YRS/> IM: CPT | Performed by: INTERNAL MEDICINE

## 2020-12-11 PROCEDURE — 90471 IMMUNIZATION ADMIN: CPT | Performed by: INTERNAL MEDICINE

## 2020-12-11 PROCEDURE — 90686 IIV4 VACC NO PRSV 0.5 ML IM: CPT | Performed by: INTERNAL MEDICINE

## 2020-12-11 PROCEDURE — 90472 IMMUNIZATION ADMIN EACH ADD: CPT | Performed by: INTERNAL MEDICINE

## 2020-12-11 PROCEDURE — 99213 OFFICE O/P EST LOW 20 MIN: CPT | Performed by: INTERNAL MEDICINE

## 2020-12-11 NOTE — TELEPHONE ENCOUNTER
Letter electronically sent over to [facility] for immunization records.  Fax confirmed as sent to [phone]

## 2020-12-11 NOTE — PROGRESS NOTES
Patient present for routine immunizations. Pt denies any symptoms , reactions or allergies that would exclude them from being immunized today. Risks and adverse reactions were discussed and the VIS was given to them. All questions were addressed. Pt was observed for 10 min post injection. There were no reactions observed.       Johnnie Madera LPN

## 2020-12-19 DIAGNOSIS — G47.01 INSOMNIA DUE TO MEDICAL CONDITION: ICD-10-CM

## 2020-12-20 RX ORDER — TRAZODONE HYDROCHLORIDE 50 MG/1
TABLET ORAL
Qty: 30 TAB | Refills: 2 | Status: SHIPPED | OUTPATIENT
Start: 2020-12-20 | End: 2021-02-23 | Stop reason: SDUPTHER

## 2021-01-05 ENCOUNTER — TELEPHONE (OUTPATIENT)
Dept: INTERNAL MEDICINE CLINIC | Age: 21
End: 2021-01-05

## 2021-01-05 NOTE — TELEPHONE ENCOUNTER
----- Message from Evan Tejeda sent at 1/5/2021  9:22 AM EST -----  Regarding: Dr. Marlee Dasilva first and last name: PT      Reason for call: Pt has questions about her medication timing for when she starts school, medication is vyvance. Callback required yes/no and why: yes      Best contact number(s): [phone] [place] Rivero/mother      Details to clarify the request: Pt will be working and is requesting that the office call her mother to discuss.       Evan Tejeda

## 2021-01-05 NOTE — TELEPHONE ENCOUNTER
Call made to patient mother to advise of providers recommendation. Patient mother thankful for the call back.

## 2021-01-05 NOTE — TELEPHONE ENCOUNTER
Return call made to patient-- spoke to mother regarding times of medicationfor vyvance. Patient takes night classes at 6pm two days out the week  and worried that medication would not last throughout the night class also wanted to know how long could she wait to take medication. Stated that she normally takes medication at [facility]. I advised her that she could take medication 1 hour before or after scheduled time. Patient would like to know what time she could take it when she has night classes to help her through her class.  Please advise

## 2021-02-23 DIAGNOSIS — G47.01 INSOMNIA DUE TO MEDICAL CONDITION: ICD-10-CM

## 2021-02-23 RX ORDER — TRAZODONE HYDROCHLORIDE 50 MG/1
50 TABLET ORAL
Qty: 90 TAB | Refills: 3 | Status: SHIPPED | OUTPATIENT
Start: 2021-02-23 | End: 2022-02-22

## 2021-06-11 ENCOUNTER — TELEPHONE (OUTPATIENT)
Dept: INTERNAL MEDICINE CLINIC | Age: 21
End: 2021-06-11

## 2021-06-11 NOTE — TELEPHONE ENCOUNTER
----- Message from Marilyn Mallory sent at 6/11/2021  7:53 AM EDT -----  Regarding: Dr Alex Alatorre has a VV appt this morning at 8 am, need to r/s appt, please call pt at [phone]

## 2021-06-11 NOTE — TELEPHONE ENCOUNTER
PSR returned patient's call, appointment rescheduled. Patient advised unexpected urgent matter this morning. Patient stated to apologize to PCP for short notice. Patient requested to reschedule appointment before scheduled time. PSR wanted to update nurse/PCP.      Estrellita Valera MRN: [id number]     Date: 6/17/2021 Status: [facility]    Time: 10:45 AM Length: 15 [id number]   Visit Type: VV SPECIAL USE CASE [[id number]] Copay: $0.00    Provider: Jeffry Galindo MD Department: [facility]    Referral #:   Referral Status:      Referring Provider:   Patient Type:      Notes: 6 Month F/U   R/S 6.11.21[place]

## 2021-06-17 ENCOUNTER — VIRTUAL VISIT (OUTPATIENT)
Dept: INTERNAL MEDICINE CLINIC | Age: 21
End: 2021-06-17
Payer: COMMERCIAL

## 2021-06-17 DIAGNOSIS — F90.2 ATTENTION DEFICIT HYPERACTIVITY DISORDER (ADHD), COMBINED TYPE: Primary | ICD-10-CM

## 2021-06-17 PROBLEM — G47.00 INSOMNIA: Status: ACTIVE | Noted: 2021-06-17

## 2021-06-17 PROCEDURE — 99213 OFFICE O/P EST LOW 20 MIN: CPT | Performed by: INTERNAL MEDICINE

## 2021-06-17 NOTE — ASSESSMENT & PLAN NOTE
6/1/20 - previously diagnosed with depression and anxiety and was on medications however was having side effects so she stopped. Reports depression has been well controlled but her anxiety has gotten worse over the past couple months. Reports that she wakes up at night and is unable to go back to sleep. Unable to shut off her thoughts at night. She wakes up afraid of family members dying. She is able to fall asleep okay but when she wakes up, she cannot fall asleep again. Wakes up around 3:00. Feels she is able to cope with her anxiety better during the day and is mostly an issue at night when she wakes up. Symptoms mostly worse at night when she wakes up in the middle of the night. Will trial trazodone. Can take up to 2 tablets at night. Also recommend therapy to discuss other behavioral modifications. Discussed sleep hygiene    11/21/20 - Trazodone 50 mg at night working well for her and symptoms are better.

## 2021-06-17 NOTE — ASSESSMENT & PLAN NOTE
11/12/20 - Diagnosed with ADHD has a child and has been on medication since age 5.  Previously on Adderall but she did not like how she felt on it.  Subsequently on Concerta but that made her anxiety worse. Kain Wynn is currently going through online school and has a lot of distractions at home which is making it more difficult for her to concentrate.  She is interested in starting medications again for ADHD.     We will try Vyvanse.  Advised to call if she has any issues with this medication.  We will follow-up in a month    12/11/20 - Medication working well for her. Sergo Leosg any worsening sleep issues, appetite issues, chest pain, palpitations.  Says the medication has been helping her focus more.  Continue current dose  ====  Did not take any classes last semester, so she did not take Vyvanse. However she is taking calculus this summer and would like to restart medications. Was working well for her last semester. Restarted her old prescription. No sleep issues, appetite issues, chest pain, palpitations. Restart Vyvanse 30 mg daily.

## 2021-06-17 NOTE — PROGRESS NOTES
Consent: Toyin Quintero, who was seen by synchronous (real-time) audio-video technology, and/or her healthcare decision maker, is aware that this patient-initiated, Telehealth encounter on 6/17/2021 is a billable service, with coverage as determined by her insurance carrier. She is aware that she may receive a bill and has provided verbal consent to proceed: Yes. I was in the office while conducting this encounter. Patient identification was verified at the start of the visit: YES  This visit was done with doxy. me  The patient is located in [place] during this visit    Note   Chief Complaint   adhd    Toyin Quintero is a 23 y.o. female     1. Attention deficit hyperactivity disorder (ADHD), combined type  Assessment & Plan:  11/12/20 - Diagnosed with ADHD has a child and has been on medication since age 5.  Previously on Adderall but she did not like how she felt on it.  Subsequently on Concerta but that made her anxiety worse. Jamir Ortiz is currently going through online school and has a lot of distractions at home which is making it more difficult for her to concentrate.  She is interested in starting medications again for ADHD.     We will try Vyvanse.  Advised to call if she has any issues with this medication.  We will follow-up in a month    12/11/20 - Medication working well for her. Crystal Chapmany any worsening sleep issues, appetite issues, chest pain, palpitations.  Says the medication has been helping her focus more.  Continue current dose  ====  Did not take any classes last semester, so she did not take Vyvanse. However she is taking calculus this summer and would like to restart medications. Was working well for her last semester. Restarted her old prescription. No sleep issues, appetite issues, chest pain, palpitations. Restart Vyvanse 30 mg daily. Orders:  -     lisdexamfetamine (Vyvanse) 30 mg capsule; Take 1 Capsule by mouth every morning.  Max Daily Amount: 30 mg., Normal, Disp-30 Capsule, R-0  -     lisdexamfetamine (Vyvanse) 30 mg capsule; Take 1 Capsule by mouth every morning. Max Daily Amount: 30 mg., Normal, Disp-30 Capsule, R-0  -     lisdexamfetamine (Vyvanse) 30 mg capsule; Take 1 Capsule by mouth every morning. Max Daily Amount: 30 mg., Normal, Disp-30 Capsule, R-0       We discussed the expected course, resolution and complications of the diagnosis(es) in detail. Medication risks, benefits, costs, interactions, and alternatives were discussed as indicated. I advised her to contact the office if her condition worsens, changes or fails to improve as anticipated. She expressed understanding with the diagnosis(es) and plan. Return to clinic:  6months for physical    Asmita Malagon MD  [facility]  6/17/2021    Future Appointments   Date Time Provider Celia John   11/18/2021  9:00 AM Humphrey Huynh MD [facility] BS AMB        Objective   Vitals:       Patient-Reported Vitals 6/17/2021   Patient-Reported Weight 180.0lb   Patient-Reported LMP 06/09/2021                 Physical Exam  Constitutional:       Appearance: Normal appearance. She is not ill-appearing. Pulmonary:      Effort: No respiratory distress. Neurological:      Mental Status: She is alert. Current Outpatient Medications   Medication Sig    lisdexamfetamine (Vyvanse) 30 mg capsule Take 1 Capsule by mouth every morning. Max Daily Amount: 30 mg.    [START ON 7/17/2021] lisdexamfetamine (Vyvanse) 30 mg capsule Take 1 Capsule by mouth every morning. Max Daily Amount: 30 mg.    [START ON 8/16/2021] lisdexamfetamine (Vyvanse) 30 mg capsule Take 1 Capsule by mouth every morning. Max Daily Amount: 30 mg.    traZODone (DESYREL) 50 mg tablet Take 1 Tab by mouth nightly as needed for Sleep.  norgestimate-ethinyl estradioL (Sprintec, 28,) 0.25-35 mg-mcg tab Take 1 Tab by mouth daily. No current facility-administered medications for this visit.        Devang Mallory is a 23 y.o. female being evaluated by a video visit encounter for concerns as above. A caregiver was present when appropriate. Due to this being a TeleHealth encounter (During ZWK-62 public health emergency), evaluation of the following organ systems was limited: Vitals/Constitutional/EENT/Resp/CV/GI//MS/Neuro/Skin/Heme-Lymph-Imm. Pursuant to the emergency declaration under the [address] [place] [address], [facility] waiver authority and the [employer] Act, this Virtual  Visit was conducted, with patient's (and/or legal guardian's) consent, to reduce the patient's risk of exposure to COVID-19 and provide necessary medical care. Services were provided through a video synchronous discussion virtually to substitute for in-person clinic visit.

## 2021-10-11 DIAGNOSIS — F90.2 ATTENTION DEFICIT HYPERACTIVITY DISORDER (ADHD), COMBINED TYPE: ICD-10-CM

## 2021-11-18 NOTE — PROGRESS NOTES
Annual exam ages 20-44    Gwynda Baumgarten is a No obstetric history on file. ,  24 y.o. female [address] [facility] Patient's last menstrual period was 11/14/2021., who presents for her annual checkup. LV=11/17/20  Still at [facility] [employer]. 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 Since her last annual GYN exam about one year ago, she has had the following changes in her health history:   - none    ADDITIONAL CONCERNS:  She is having no significant problems. With regard to the Gardasil vaccine, she has received all 3 injections. Menstrual status:    Her periods are light in flow. She is using one to two pads or tampons per day, usually regular and last 26-30 days. She denies dysmenorrhea. She denies premenstrual symptoms. Contraception:    The current method of family planning is OCP (estrogen/progesterone). Sexual history:     She  reports previously being sexually active and has had partner(s) who are male. .        Pap and Mammogram History:    Her most recent Pap smear was normal, obtained 11/17/20. She does not have a history of abnormal paps. The patient has never had a mammogram.    Breast Cancer History/Substance Abuse:    Past Medical History:   Diagnosis Date    ADD (attention deficit disorder)     Anxiety     Depression with anxiety      No past surgical history on file. OB History   No obstetric history on file. Current Outpatient Medications   Medication Sig Dispense Refill    [START ON 12/11/2021] lisdexamfetamine (Vyvanse) 30 mg capsule Take 1 Capsule by mouth every morning. Max Daily Amount: 30 mg. 30 Capsule 0    traZODone (DESYREL) 50 mg tablet Take 1 Tab by mouth nightly as needed for Sleep. 90 Tab 3    norgestimate-ethinyl estradioL (Sprintec, 28,) 0.25-35 mg-mcg tab Take 1 Tab by mouth daily.  3 Package 4     Allergies: Clindamycin and Penicillins   Social History     Socioeconomic History    Marital status: SINGLE     Spouse name: Not on file    Number of children: Not on file    Years of education: Not on file    Highest education level: Not on file   Occupational History    Not on file   Tobacco Use    Smoking status: Never Smoker    Smokeless tobacco: Former User    Tobacco comment: vaping   Substance and Sexual Activity    Alcohol use: Never    Drug use: Yes     Types: Marijuana     Comment: marijuana daily    Sexual activity: Not Currently     Partners: Male   Other Topics Concern    Not on file   Social History Narrative    Not on file     Social Determinants of Health     Financial Resource Strain:     Difficulty of Paying Living Expenses: Not on file   Food Insecurity:     Worried About Running Out of Food in the Last Year: Not on file    Glynn of Food in the Last Year: Not on file   Transportation Needs:     Lack of Transportation (Medical): Not on file    Lack of Transportation (Non-Medical): Not on file   Physical Activity:     Days of Exercise per Week: Not on file    Minutes of Exercise per Session: Not on file   Stress:     Feeling of Stress : Not on file   Social Connections:     Frequency of Communication with Friends and Family: Not on file    Frequency of Social Gatherings with Friends and Family: Not on file    Attends Adventism Services: Not on file    Active Member of [address] [place] [address] [employer] or Organizations: Not on file    Attends Club or Organization Meetings: Not on file    Marital Status: Not on file   Intimate Partner Violence:     Fear of Current or Ex-Partner: Not on file    Emotionally Abused: Not on file    Physically Abused: Not on file    Sexually Abused: Not on file   Housing Stability:     Unable to Pay for Housing in the Last Year: Not on file    Number of [place] in the Last Year: Not on file    Unstable Housing in the Last Year: Not on file     Tobacco History:  reports that she has never smoked.  She quit smokeless tobacco use about 1 years ago.  Alcohol Abuse:  reports no history of alcohol use. Drug Abuse:  reports current drug use. Drug: Marijuana.     Patient Active Problem List   Diagnosis Code    ADD (attention deficit disorder) F98.8    Anxiety F41.9    Rec[facility] F33.40    Iron deficiency E61.1    Routine Papanicolaou smear Z12.4    Insomnia G47.00     Family History   Problem Relation Age of Onset    Hypertension Father     Heart Disease Maternal Grandmother     Kidney Disease Maternal Grandmother     Uterine Cancer Maternal Grandmother     Hypertension Maternal Grandmother     Heart Disease Maternal Grandfather     Hypertension Maternal Grandfather     Stroke Maternal Grandfather     Hypertension Paternal Grandmother     Heart Attack Paternal Grandfather                Review of Systems - History obtained from the patient  Constitutional: negative for weight loss, fever, night sweats  HEENT: negative for hearing loss, earache, congestion, snoring, sorethroat  CV: negative for chest pain, palpitations, edema  Resp: negative for cough, shortness of breath, wheezing  GI: negative for change in bowel habits, abdominal pain, black or bloody stools  : negative for frequency, dysuria, hematuria, vaginal discharge  MSK: negative for back pain, joint pain, muscle pain  Breast: negative for breast lumps, nipple discharge, galactorrhea  Skin :negative for itching, rash, hives  Neuro: negative for dizziness, headache, confusion, weakness  Psych: negative for anxiety, depression, change in mood  Heme/lymph: negative for bleeding, bruising, pallor    Physical Exam    Visit Vitals  BP (!) 126/49   Pulse (!) 47   Wt 187 lb (84.8 kg)   LMP 2021   BMI 32.10 kg/m²       Constitutional  · Appearance: well-nourished, well developed, alert, in no acute distress    HENT  · Head and Face: appears normal    Neck  · Inspection/Palpation: normal appearance, no masses or tenderness  · Lymph Nodes: no lymphadenopathy present  · Thyroid: gland size normal, nontender, no nodules or masses present on palpation    Chest  · Respiratory Effort: breathing unlabored  · Auscultation: normal breath sounds    Cardiovascular  · Heart:  · Auscultation: regular rate and rhythm without murmur    Breasts  · Inspection of Breasts: breasts symmetrical, no skin changes, no discharge present, nipple appearance normal, no skin retraction present  · Palpation of Breasts and Axillae: no masses present on palpation, no breast tenderness  · Axillary Lymph Nodes: no lymphadenopathy present    Gastrointestinal  · Abdominal Examination: abdomen non-tender to palpation, normal bowel sounds, no masses present  · Liver and spleen: no hepatomegaly present, spleen not palpable  · Hernias: no hernias identified    Genitourinary  · External Genitalia: normal appearance for age, no discharge present, no tenderness present, no inflammatory lesions present, no masses present, no atrophy present  · Vagina: normal vaginal vault without central or paravaginal defects, no discharge present, no inflammatory lesions present, no masses present; small amt dark menstrual blood  · Bladder: non-tender to palpation  · Urethra: appears normal  · Cervix: normal   · Uterus: normal size, shape and consistency  · Adnexa: no adnexal tenderness present, no adnexal masses present  · Perineum: perineum within normal limits, no evidence of trauma, no rashes or skin lesions present  · Anus: anus within normal limits, no hemorrhoids present  · Inguinal Lymph Nodes: no lymphadenopathy present    Skin  · General Inspection: no rash, no lesions identified    Neurologic/Psychiatric  · Mental Status:  · Orientation: grossly oriented to person, place and time  · Mood and Affect: mood normal, affect appropriate            Assessment & Plan:  · Routine gynecologic examination. Pap 11/1/20  · STI screening <24yo. · Cycles well controlled on Sprintec.   eRX #3 RFx4  · Her current medical status is satisfactory with no evidence of significant gynecologic issues. · Counseled re: diet, exercise, healthy lifestyle  · Return for yearly wellness visits  · Patient verbalized understanding    Orders Placed This Encounter    CT/NG/T.VAGINALIS AMPLIFICATION     Order Specific Question:   Specimen source     Answer:   Vagina [[id number]]    norgestimate-ethinyl estradioL (Sprintec, 28,) 0.25-35 mg-mcg tab     Sig: Take 1 Tablet by mouth daily.      Dispense:  3 Dose Pack     Refill:  4

## 2021-11-19 ENCOUNTER — OFFICE VISIT (OUTPATIENT)
Dept: OBGYN CLINIC | Age: 21
End: 2021-11-19
Payer: COMMERCIAL

## 2021-11-19 VITALS
HEART RATE: 47 BPM | DIASTOLIC BLOOD PRESSURE: 49 MMHG | WEIGHT: 187 LBS | SYSTOLIC BLOOD PRESSURE: 126 MMHG | BODY MASS INDEX: 32.1 KG/M2

## 2021-11-19 DIAGNOSIS — Z01.419 ENCOUNTER FOR GYNECOLOGICAL EXAMINATION: Primary | ICD-10-CM

## 2021-11-19 DIAGNOSIS — Z11.3 SCREEN FOR STD (SEXUALLY TRANSMITTED DISEASE): ICD-10-CM

## 2021-11-19 PROCEDURE — 99395 PREV VISIT EST AGE 18-39: CPT | Performed by: OBSTETRICS & GYNECOLOGY

## 2021-11-19 RX ORDER — NORGESTIMATE AND ETHINYL ESTRADIOL 0.25-0.035
1 KIT ORAL DAILY
Qty: 3 DOSE PACK | Refills: 4 | Status: SHIPPED | OUTPATIENT
Start: 2021-11-19

## 2021-11-23 LAB
C TRACH RRNA SPEC QL NAA+PROBE: NEGATIVE
N GONORRHOEA RRNA SPEC QL NAA+PROBE: NEGATIVE
SPECIMEN STATUS REPORT, ROLRST: NORMAL
T VAGINALIS DNA SPEC QL NAA+PROBE: NEGATIVE

## 2021-12-30 ENCOUNTER — TELEPHONE (OUTPATIENT)
Dept: INTERNAL MEDICINE CLINIC | Age: 21
End: 2021-12-30

## 2022-01-07 ENCOUNTER — VIRTUAL VISIT (OUTPATIENT)
Dept: INTERNAL MEDICINE CLINIC | Age: 22
End: 2022-01-07
Payer: COMMERCIAL

## 2022-01-07 DIAGNOSIS — R21 RASH: Primary | ICD-10-CM

## 2022-01-07 PROCEDURE — 99442 PR PHYS/QHP TELEPHONE EVALUATION 11-20 MIN: CPT | Performed by: INTERNAL MEDICINE

## 2022-01-07 RX ORDER — PREDNISONE 10 MG/1
TABLET ORAL
Qty: 21 TABLET | Refills: 0 | Status: SHIPPED | OUTPATIENT
Start: 2022-01-07 | End: 2022-03-01

## 2022-01-07 NOTE — PROGRESS NOTES
Perez Taylor is a 24 y.o. female evaluated via audio only technology on 1/7/2022. Consent: She and/or her health care decision maker is aware that she may receive a bill for this audio only encounter, depending on her insurance coverage, and has provided verbal consent to proceed: Yes    I communicated with the patient and/or health care decision maker about the nature and details of the following: rash    I affirm this is a Patient-Initiated Episode with a Patient who has not had a related appointment within my department in the past 7 days or scheduled within the next 24 hours. Total Time: minutes: 11-20 minutes    The patient is located in [place] during this visit    Note   Chief Complaint   rash    Perez Taylor is a 24 y.o. female     Pt unable to get her video working; doxy was not working for me either    1. Rash  -     predniSONE (STERAPRED DS) 10 mg dose pack; See administration instruction per 10mg dose pack, Normal, Disp-21 Tablet, R-0  Onset 2 weeks ago   Hives on legs/arms - small red dots that then puff up and get bigger, itchy, dry   Lesion themselves lasts for 3-4 days   Rash around neck - red  She's wondering if it's stress from work? No new medicines, detergent, soaps  No fevers, chills  No trouble swallowing, throat swelling   No recent colds  Tried an OTC cream and benadryl   No joint pain, swelling  No CP, SOB  No travel, no outside exposures   No sore throat   Difficult to assess without any visual aide. rec prednisone course and if still an issue, rec in person eval. Continue benadryl as needed for itching    We discussed the expected course, resolution and complications of the diagnosis(es) in detail. Medication risks, benefits, costs, interactions, and alternatives were discussed as indicated. I advised her to contact the office if her condition worsens, changes or fails to improve as anticipated. She expressed understanding with the diagnosis(es) and plan.      Return to clinic:  As needed    Shameka Aly MD  [facility]  1/7/2022    No future appointments.      Objective   Vitals:       Patient-Reported Vitals 1/7/2022   Patient-Reported Weight 187lb   Patient-Reported LMP -                 Physical Exam  Constitutional:       Comments: No respiratory distress over the phone

## 2022-02-22 DIAGNOSIS — G47.01 INSOMNIA DUE TO MEDICAL CONDITION: ICD-10-CM

## 2022-02-22 RX ORDER — TRAZODONE HYDROCHLORIDE 50 MG/1
TABLET ORAL
Qty: 90 TABLET | Refills: 3 | Status: SHIPPED | OUTPATIENT
Start: 2022-02-22

## 2022-03-01 ENCOUNTER — OFFICE VISIT (OUTPATIENT)
Dept: INTERNAL MEDICINE CLINIC | Age: 22
End: 2022-03-01
Payer: COMMERCIAL

## 2022-03-01 VITALS
WEIGHT: 191 LBS | BODY MASS INDEX: 32.61 KG/M2 | HEART RATE: 88 BPM | SYSTOLIC BLOOD PRESSURE: 123 MMHG | DIASTOLIC BLOOD PRESSURE: 80 MMHG | HEIGHT: 64 IN | OXYGEN SATURATION: 100 % | TEMPERATURE: 97.2 F | RESPIRATION RATE: 16 BRPM

## 2022-03-01 DIAGNOSIS — F33.40 RECURRENT MAJOR DEPRESSIVE DISORDER, IN REMISSION (HCC): ICD-10-CM

## 2022-03-01 DIAGNOSIS — L50.9 URTICARIA: Primary | ICD-10-CM

## 2022-03-01 PROCEDURE — 99214 OFFICE O/P EST MOD 30 MIN: CPT | Performed by: INTERNAL MEDICINE

## 2022-03-01 RX ORDER — LORATADINE 10 MG/1
10 TABLET ORAL DAILY
Qty: 90 TABLET | Refills: 1 | Status: SHIPPED | OUTPATIENT
Start: 2022-03-01 | End: 2022-08-02

## 2022-03-01 NOTE — PROGRESS NOTES
Note   Chief Complaint   rash    Lou Albert is a 24 y.o. female     1. Urticaria  Assessment & Plan:  22 - Onset 2 weeks ago   Hives on legs/arms - small red dots that then puff up and get bigger, itchy, dry   Lesion themselves lasts for 3-4 days   Rash around neck - red  She's wondering if it's stress from work? No new medicines, detergent, soaps  No fevers, chills  No trouble swallowing, throat swelling   No recent colds  Tried an OTC cream and benadryl   No joint pain, swelling  No CP, SOB  No travel, no outside exposures   No sore throat     Difficult to assess without any visual aide. rec prednisone course and if still an issue, rec in person eval. Continue benadryl as needed for itching  =====================  Still having rash  Raised rash occurs a couple times a week and goes away within 24 hours  Flat red spots occur daily but go away within 24 hours   No SOB   Just on arms and legs   Prednisone - calmed it down but never fully went away   No SOB, cough  No new medicines, detergent, soaps, tattoos   Not painful   Aunt  2 weeks ago from [facility], noticed arms had raised rash while in the hospital - she's wondering if it's related to stress  Raised rash does appear to look like urticaria (had pictures on her phone). rec starting claritin daily (pt concerned regarding sedation). Discuss that if no improvement, rec adding famotidine. Check labs. Less likely vasculitis given lesions resolve quickly but consider testing if still persistent or if lesions last longer. Advised to call in 2 weeks to let us know how she's doing  Orders:  -     loratadine (Claritin) 10 mg tablet; Take 1 Tablet by mouth daily. Indications: hives, Normal, Disp-90 Tablet, R-1  -     CBC WITH AUTOMATED DIFF; Future  -     SED RATE (ESR); Future  -     C REACTIVE PROTEIN, QT; Future  -     TSH 3RD GENERATION; Future  -     T4, FREE;  Future  -     REFERRAL TO ALLERGY  2. Recurrent major depressive disorder, in remission [facility])  Assessment & Plan:   well controlled, continue current medications       Benefits, risks, possible drug interactions, and side effects of all new medications were reviewed with the patient. Pt verbalized understanding. Return to clinic:  Pending rash development    An electronic signature was used to authenticate this note. Michela Bryant MD  [facility]  3/1/2022    No future appointments. Objective   Vitals:       Visit Vitals  /80 (BP 1 Location: Left upper arm, BP Patient Position: Sitting, BP Cuff Size: Adult)   Pulse 88   Temp 97.2 °F (36.2 °C) (Temporal)   Resp 16   Ht 5' 4\" (1.626 m)   Wt 191 lb (86.6 kg)   SpO2 100%   BMI 32.79 kg/m²        Physical Exam  Constitutional:       Appearance: Normal appearance. She is not ill-appearing. Cardiovascular:      Rate and Rhythm: Normal rate. Pulmonary:      Effort: No respiratory distress. Skin:     Comments: No current rash noted   Neurological:      Mental Status: She is alert. Current Outpatient Medications   Medication Sig    loratadine (Claritin) 10 mg tablet Take 1 Tablet by mouth daily. Indications: hives    traZODone (DESYREL) 50 mg tablet TAKE 1 TABLET BY MOUTH NIGHTLY AS NEEDED FOR SLEEP    norgestimate-ethinyl estradioL (Sprintec, 28,) 0.25-35 mg-mcg tab Take 1 Tablet by mouth daily. No current facility-administered medications for this visit.

## 2022-03-01 NOTE — ASSESSMENT & PLAN NOTE
22 - Onset 2 weeks ago   Hives on legs/arms - small red dots that then puff up and get bigger, itchy, dry   Lesion themselves lasts for 3-4 days   Rash around neck - red  She's wondering if it's stress from work? No new medicines, detergent, soaps  No fevers, chills  No trouble swallowing, throat swelling   No recent colds  Tried an OTC cream and benadryl   No joint pain, swelling  No CP, SOB  No travel, no outside exposures   No sore throat     Difficult to assess without any visual aide. rec prednisone course and if still an issue, rec in person eval. Continue benadryl as needed for itching  =====================  Still having rash  Raised rash occurs a couple times a week and goes away within 24 hours  Flat red spots occur daily but go away within 24 hours   No SOB   Just on arms and legs   Prednisone - calmed it down but never fully went away   No SOB, cough  No new medicines, detergent, soaps, tattoos   Not painful   Aunt  2 weeks ago from [facility], noticed arms had raised rash while in the hospital - she's wondering if it's related to stress  Raised rash does appear to look like urticaria (had pictures on her phone). rec starting claritin daily (pt concerned regarding sedation). Discuss that if no improvement, rec adding famotidine. Check labs. Less likely vasculitis given lesions resolve quickly but consider testing if still persistent or if lesions last longer.   Advised to call in 2 weeks to let us know how she's doing

## 2022-03-02 ENCOUNTER — TELEPHONE (OUTPATIENT)
Dept: INTERNAL MEDICINE CLINIC | Age: 22
End: 2022-03-02

## 2022-03-02 LAB
BASOPHILS # BLD: 0 K/UL (ref 0–0.1)
BASOPHILS NFR BLD: 1 % (ref 0–1)
CRP SERPL-MCNC: 0.81 MG/DL (ref 0–0.6)
DIFFERENTIAL METHOD BLD: NORMAL
EOSINOPHIL # BLD: 0.3 K/UL (ref 0–0.4)
EOSINOPHIL NFR BLD: 4 % (ref 0–7)
ERYTHROCYTE [DISTWIDTH] IN BLOOD BY AUTOMATED COUNT: 12.9 % (ref 11.5–14.5)
ERYTHROCYTE [SEDIMENTATION RATE] IN BLOOD: 6 MM/HR (ref 0–20)
HCT VFR BLD AUTO: 42.4 % (ref 35–47)
HGB BLD-MCNC: 13.2 G/DL (ref 11.5–16)
IMM GRANULOCYTES # BLD AUTO: 0 K/UL (ref 0–0.04)
IMM GRANULOCYTES NFR BLD AUTO: 0 % (ref 0–0.5)
LYMPHOCYTES # BLD: 2.4 K/UL (ref 0.8–3.5)
LYMPHOCYTES NFR BLD: 34 % (ref 12–49)
MCH RBC QN AUTO: 28 PG (ref 26–34)
MCHC RBC AUTO-ENTMCNC: 31.1 G/DL (ref 30–36.5)
MCV RBC AUTO: 90 FL (ref 80–99)
MONOCYTES # BLD: 0.5 K/UL (ref 0–1)
MONOCYTES NFR BLD: 7 % (ref 5–13)
NEUTS SEG # BLD: 3.8 K/UL (ref 1.8–8)
NEUTS SEG NFR BLD: 54 % (ref 32–75)
NRBC # BLD: 0 K/UL (ref 0–0.01)
NRBC BLD-RTO: 0 PER 100 WBC
PLATELET # BLD AUTO: 293 K/UL (ref 150–400)
PMV BLD AUTO: 10.6 FL (ref 8.9–12.9)
RBC # BLD AUTO: 4.71 M/UL (ref 3.8–5.2)
T4 FREE SERPL-MCNC: 1 NG/DL (ref 0.8–1.5)
TSH SERPL DL<=0.05 MIU/L-ACNC: 0.81 UIU/ML (ref 0.36–3.74)
WBC # BLD AUTO: 7 K/UL (ref 3.6–11)

## 2022-03-02 NOTE — TELEPHONE ENCOUNTER
----- Message from Stevie Gibson MD sent at 7/8/4499  4:00 PM EST -----  Regarding: call  Please call the pt - I forgot to give her an allergy referral.Please provide info:  Gabriel Nielsen[address]  [phone]

## 2022-03-03 NOTE — PROGRESS NOTES
Letter sent. thyroid studies normal.  CRP 0.81 (high normal 0.6).   ESR normal.  CBC normal.     CRP slightly elevated but ESR normal and CBC normal. Rec tx for urticaria but if changes, consider vasculitis workup

## 2022-03-18 PROBLEM — L50.9 URTICARIA: Status: ACTIVE | Noted: 2022-03-01

## 2022-03-18 PROBLEM — G47.00 INSOMNIA: Status: ACTIVE | Noted: 2021-06-17

## 2022-03-19 PROBLEM — F33.40 RECURRENT MAJOR DEPRESSIVE DISORDER, IN REMISSION (HCC): Status: ACTIVE | Noted: 2020-06-01

## 2022-03-19 PROBLEM — Z12.4 ROUTINE PAPANICOLAOU SMEAR: Status: ACTIVE | Noted: 2020-12-02

## 2022-03-20 PROBLEM — E61.1 IRON DEFICIENCY: Status: ACTIVE | Noted: 2020-06-01

## 2022-08-02 ENCOUNTER — OFFICE VISIT (OUTPATIENT)
Dept: INTERNAL MEDICINE CLINIC | Age: 22
End: 2022-08-02
Payer: COMMERCIAL

## 2022-08-02 VITALS
TEMPERATURE: 98 F | SYSTOLIC BLOOD PRESSURE: 120 MMHG | DIASTOLIC BLOOD PRESSURE: 78 MMHG | OXYGEN SATURATION: 98 % | WEIGHT: 192 LBS | HEIGHT: 64 IN | BODY MASS INDEX: 32.78 KG/M2 | RESPIRATION RATE: 14 BRPM | HEART RATE: 89 BPM

## 2022-08-02 DIAGNOSIS — E55.9 VITAMIN D DEFICIENCY: ICD-10-CM

## 2022-08-02 DIAGNOSIS — Z11.59 NEED FOR HEPATITIS C SCREENING TEST: ICD-10-CM

## 2022-08-02 DIAGNOSIS — E66.9 CLASS 1 OBESITY WITHOUT SERIOUS COMORBIDITY WITH BODY MASS INDEX (BMI) OF 32.0 TO 32.9 IN ADULT, UNSPECIFIED OBESITY TYPE: ICD-10-CM

## 2022-08-02 DIAGNOSIS — R94.6 BORDERLINE ABNORMAL TFTS: ICD-10-CM

## 2022-08-02 DIAGNOSIS — L50.9 URTICARIA: Primary | ICD-10-CM

## 2022-08-02 DIAGNOSIS — L65.9 HAIR LOSS: ICD-10-CM

## 2022-08-02 DIAGNOSIS — G47.00 INSOMNIA, UNSPECIFIED TYPE: ICD-10-CM

## 2022-08-02 PROCEDURE — 99214 OFFICE O/P EST MOD 30 MIN: CPT | Performed by: INTERNAL MEDICINE

## 2022-08-02 RX ORDER — ASPIRIN 325 MG
50000 TABLET, DELAYED RELEASE (ENTERIC COATED) ORAL
COMMUNITY
Start: 2022-06-30

## 2022-08-02 NOTE — PROGRESS NOTES
Note   Chief Complaint   Follow-up    Tianna Houston is a 20 y.o. female     Accompanied by mother who provides some history     1. Urticaria  Assessment & Plan:   Saw allergy and had skin testing  Allergic to elm, dust mites  No food allergies  Happening a few times a week  Individual lesions not as big as before  Not raised anymore - just red spots   Unclear etiology still. Given previous appearance, recommend vasculitis labs  Orders:  -     ALYSSA, DIRECT, W/REFLEX; Future  -     COMPLEMENT, C3 & C4; Future  -     ANCA PANEL; Future  2. Borderline abnormal TFTs  Assessment & Plan:   Seen by allergist since last visit. Had her thyroid checked and was told her thyroid number was over 100. Presumably this is her TSH  Having significant hair loss, weight gain without significant change in her diet  Worsening anxiety  No hot or cold intolerance, constipation, diarrhea  Had a normal TSH in March. Recommend repeating test to confirm findings. We will try to get records from Dr. Rose Daily  Orders:  -     Darron Cabrera; Future  -     T4, FREE; Future  3. Hair loss  -     METABOLIC PANEL, COMPREHENSIVE; Future  -     VITAMIN B12 & FOLATE; Future  -     IRON PROFILE; Future  -     FERRITIN; Future  -     VITAMIN D, 25 HYDROXY; Future  4. Need for hepatitis C screening test  -     HEPATITIS C AB; Future  5. Class 1 obesity without serious comorbidity with body mass index (BMI) of 32.0 to 32.9 in adult, unspecified obesity type  -     LIPID PANEL; Future  -     HEMOGLOBIN A1C WITH EAG; Future  6. Insomnia, unspecified type  Assessment & Plan:  Reports she was told by her allergist to stop her trazodone because it is a narcotic  Medication working well for her. Advised to continue for now but if she wants to make changes in the future, we could. Discussed that the medicines are addictive. 7. Vitamin D deficiency  Assessment & Plan:  Reports allergist checked her vitamin D and it was low.   Started on weekly vitamin D     Benefits, risks, possible drug interactions, and side effects of all new medications were reviewed with the patient. Pt verbalized understanding. Return to clinic:  pending labs  business admin    An electronic signature was used to authenticate this note. John Fernández MD  [facility]  8/3/2022    No future appointments. Objective   Vitals:       Visit Vitals  /78 (BP 1 Location: Left upper arm, BP Patient Position: Sitting, BP Cuff Size: Adult)   Pulse 89   Temp 98 °F (36.7 °C) (Oral)   Resp 14   Ht 5' 4\" (1.626 m)   Wt 192 lb (87.1 kg)   LMP 07/15/2022 (Exact Date)   SpO2 98%   BMI 32.96 kg/m²        Physical Exam  Constitutional:       Appearance: Normal appearance. She is not ill-appearing. Cardiovascular:      Rate and Rhythm: Normal rate and regular rhythm. Heart sounds: No murmur heard. No friction rub. No gallop. Pulmonary:      Effort: No respiratory distress. Breath sounds: Normal breath sounds. No wheezing, rhonchi or rales. Skin:     Comments: No current lesions   Neurological:      Mental Status: She is alert. Current Outpatient Medications   Medication Sig    cholecalciferol (VITAMIN D3) (50,000 UNITS /1250 MCG) capsule Take 50,000 Units by mouth every seven (7) days. traZODone (DESYREL) 50 mg tablet TAKE 1 TABLET BY MOUTH NIGHTLY AS NEEDED FOR SLEEP    norgestimate-ethinyl estradioL (Sprintec, 28,) 0.25-35 mg-mcg tab Take 1 Tablet by mouth daily. No current facility-administered medications for this visit.

## 2022-08-03 ENCOUNTER — TELEPHONE (OUTPATIENT)
Dept: INTERNAL MEDICINE CLINIC | Age: 22
End: 2022-08-03

## 2022-08-03 PROBLEM — E55.9 VITAMIN D DEFICIENCY: Status: ACTIVE | Noted: 2022-08-03

## 2022-08-03 PROBLEM — R94.6 BORDERLINE ABNORMAL TFTS: Status: ACTIVE | Noted: 2022-08-03

## 2022-08-03 LAB
25(OH)D3 SERPL-MCNC: 64 NG/ML (ref 30–100)
ALBUMIN SERPL-MCNC: 4 G/DL (ref 3.5–5)
ALBUMIN/GLOB SERPL: 1.2 {RATIO} (ref 1.1–2.2)
ALP SERPL-CCNC: 72 U/L (ref 45–117)
ALT SERPL-CCNC: 17 U/L (ref 12–78)
ANA SER QL: NEGATIVE
ANION GAP SERPL CALC-SCNC: 7 MMOL/L (ref 5–15)
AST SERPL-CCNC: 7 U/L (ref 15–37)
BILIRUB SERPL-MCNC: 0.6 MG/DL (ref 0.2–1)
BUN SERPL-MCNC: 11 MG/DL (ref 6–20)
BUN/CREAT SERPL: 16 (ref 12–20)
C-ANCA TITR SER IF: NORMAL TITER
C3 SERPL-MCNC: 128 MG/DL (ref 82–167)
C4 SERPL-MCNC: 25 MG/DL (ref 12–38)
CALCIUM SERPL-MCNC: 9.3 MG/DL (ref 8.5–10.1)
CHLORIDE SERPL-SCNC: 106 MMOL/L (ref 97–108)
CHOLEST SERPL-MCNC: 142 MG/DL
CO2 SERPL-SCNC: 24 MMOL/L (ref 21–32)
CREAT SERPL-MCNC: 0.68 MG/DL (ref 0.55–1.02)
EST. AVERAGE GLUCOSE BLD GHB EST-MCNC: 100 MG/DL
FERRITIN SERPL-MCNC: 11 NG/ML (ref 26–388)
FOLATE SERPL-MCNC: 10 NG/ML (ref 5–21)
GLOBULIN SER CALC-MCNC: 3.3 G/DL (ref 2–4)
GLUCOSE SERPL-MCNC: 90 MG/DL (ref 65–100)
HBA1C MFR BLD: 5.1 % (ref 4–5.6)
HCV AB SERPL QL IA: NONREACTIVE
HDLC SERPL-MCNC: 65 MG/DL
HDLC SERPL: 2.2 {RATIO} (ref 0–5)
IRON SATN MFR SERPL: 20 % (ref 20–50)
IRON SERPL-MCNC: 78 UG/DL (ref 35–150)
LDLC SERPL CALC-MCNC: 68.4 MG/DL (ref 0–100)
MYELOPEROXIDASE AB SER IA-ACNC: <0.2 UNITS (ref 0–0.9)
P-ANCA ATYPICAL TITR SER IF: NORMAL TITER
P-ANCA TITR SER IF: NORMAL TITER
POTASSIUM SERPL-SCNC: 4.4 MMOL/L (ref 3.5–5.1)
PROT SERPL-MCNC: 7.3 G/DL (ref 6.4–8.2)
PROTEINASE3 AB SER IA-ACNC: <0.2 UNITS (ref 0–0.9)
SODIUM SERPL-SCNC: 137 MMOL/L (ref 136–145)
T4 FREE SERPL-MCNC: 1.1 NG/DL (ref 0.8–1.5)
TIBC SERPL-MCNC: 392 UG/DL (ref 250–450)
TRIGL SERPL-MCNC: 43 MG/DL (ref ?–150)
TSH SERPL DL<=0.05 MIU/L-ACNC: 0.89 UIU/ML (ref 0.36–3.74)
VIT B12 SERPL-MCNC: 310 PG/ML (ref 193–986)
VLDLC SERPL CALC-MCNC: 8.6 MG/DL

## 2022-08-03 NOTE — ASSESSMENT & PLAN NOTE
Reports she was told by her allergist to stop her trazodone because it is a narcotic  Medication working well for her. Advised to continue for now but if she wants to make changes in the future, we could. Discussed that the medicines are addictive.

## 2022-08-03 NOTE — TELEPHONE ENCOUNTER
----- Message from Samia Shaver MD sent at 1/6/8559  7:45 AM EDT -----  Regarding: records  Please request records and labs from Dr. Enrique Morales with allergy for this pt, particularly her thyroid test.  Thanks!

## 2022-08-03 NOTE — ASSESSMENT & PLAN NOTE
Seen by allergist since last visit. Had her thyroid checked and was told her thyroid number was over 100. Presumably this is her TSH  Having significant hair loss, weight gain without significant change in her diet  Worsening anxiety  No hot or cold intolerance, constipation, diarrhea  Had a normal TSH in March. Recommend repeating test to confirm findings.   We will try to get records from Dr. Jordan Luciano

## 2022-08-03 NOTE — TELEPHONE ENCOUNTER
Letter of request for records faxed to Dr. Miguel Angel Crook at Fax: [phone]  Via electronically fax confirmed as sent successfully.

## 2022-08-03 NOTE — ASSESSMENT & PLAN NOTE
Saw allergy and had skin testing  Allergic to elm, dust mites  No food allergies  Happening a few times a week  Individual lesions not as big as before  Not raised anymore - just red spots   Unclear etiology still.   Given previous appearance, recommend vasculitis labs

## 2022-08-07 PROBLEM — R76.8 ANTI-TPO ANTIBODIES PRESENT: Status: ACTIVE | Noted: 2022-08-07

## 2022-08-07 NOTE — PROGRESS NOTES
EXTRABANCA message sent. Normal complement levels. Negative ANCA. Negative ALYSSA. Normal A1c. Normal lipid panel. Vitamin D 64. Hep C negative. Ferritin 11. Iron saturation 20. B12 and folate normal.  TSH normal.  Free T4 normal.  CMP normal    Labs from allergist were sent and reviewed. The thyroid test they were talking about is an anti-TPO, which was elevated. However, TSH/free T4 normal - rec monitoring every  months.     Other autoimmune testing negative  Iron on low end of normal. Previous hgb 13.2, monitor

## 2022-08-25 ENCOUNTER — OFFICE VISIT (OUTPATIENT)
Dept: INTERNAL MEDICINE CLINIC | Age: 22
End: 2022-08-25
Payer: COMMERCIAL

## 2022-08-25 VITALS
TEMPERATURE: 98 F | HEART RATE: 85 BPM | BODY MASS INDEX: 32.95 KG/M2 | RESPIRATION RATE: 14 BRPM | SYSTOLIC BLOOD PRESSURE: 115 MMHG | HEIGHT: 64 IN | OXYGEN SATURATION: 98 % | DIASTOLIC BLOOD PRESSURE: 79 MMHG | WEIGHT: 193 LBS

## 2022-08-25 DIAGNOSIS — L50.9 URTICARIA: ICD-10-CM

## 2022-08-25 DIAGNOSIS — E55.9 VITAMIN D DEFICIENCY: ICD-10-CM

## 2022-08-25 DIAGNOSIS — R94.6 BORDERLINE ABNORMAL TFTS: Primary | ICD-10-CM

## 2022-08-25 DIAGNOSIS — E61.1 IRON DEFICIENCY: ICD-10-CM

## 2022-08-25 DIAGNOSIS — M79.671 RIGHT FOOT PAIN: ICD-10-CM

## 2022-08-25 DIAGNOSIS — L65.9 HAIR LOSS: ICD-10-CM

## 2022-08-25 PROCEDURE — 99214 OFFICE O/P EST MOD 30 MIN: CPT | Performed by: INTERNAL MEDICINE

## 2022-08-25 RX ORDER — FERROUS SULFATE 325(65) MG
325 TABLET, DELAYED RELEASE (ENTERIC COATED) ORAL EVERY OTHER DAY
Qty: 90 TABLET | Refills: 1 | Status: SHIPPED | OUTPATIENT
Start: 2022-08-25

## 2022-08-25 NOTE — PROGRESS NOTES
Note   Chief Complaint   Lab review    Eliud Adkins is a 20 y.o. female     Accompanied by mother    1. Borderline abnormal TFTs  Assessment & Plan:  Labs reviewed form Dr. Ramonita Calix - pt was positive for TPO antibodies. TFTs normal last check. Discussed labs with the patient and her mother  Advised at increased risk for thyroid disease in the future due to antibotides. Recommend monitoring thyroid studies every 6 months for 2 years then consider yearly and as needed checks  2. Right foot pain  Assessment & Plan:   Burning on bottom and top of foot  Works as a , on her feet a lot  Feels crunching sensation when she feels it  Worse during the day  Toes go numb when driving   Requesting referral. Podiatry referral provided. Orders:  -     REFERRAL TO PODIATRY  3. Vitamin D deficiency  Assessment & Plan:  rec finishing prescription then checking level 2 weeks after last pill to decide next steps  Orders:  -     VITAMIN D, 25 HYDROXY; Future  4. Iron deficiency  Assessment & Plan:  Ferritin 11. +heavy periods; eats veggies and meat  rec starting iron supplement every other day  Orders:  -     ferrous sulfate (IRON) 325 mg (65 mg iron) EC tablet; Take 1 Tablet by mouth every other day., Normal, Disp-90 Tablet, R-1  5. Urticaria  Assessment & Plan:  Autoimmune testing negative  rec discussing with derm  6. Hair loss  Assessment & Plan:  Ongoing for the past 2 years  Normal TFTs  Iron level low b12 folate normal  Taking vitamin D supplements  rec discussing with derm      Benefits, risks, possible drug interactions, and side effects of all new medications were reviewed with the patient. Pt verbalized understanding. 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 Amandeep Grewal MD  [facility]  8/25/2022    No future appointments.        Objective   Vitals:       Visit Vitals  /79 (BP 1 Location: Left upper arm, BP Patient Position: Sitting, BP Cuff Size: Small adult)   Pulse 85   Temp 98 °F (36.7 °C) (Oral)   Resp 14   Ht 5' 4\" (1.626 m)   Wt 193 lb (87.5 kg)   LMP  (LMP Unknown)   SpO2 98%   BMI 33.13 kg/m²        Physical Exam  Constitutional:       Appearance: Normal appearance. She is not ill-appearing. Pulmonary:      Effort: No respiratory distress. Musculoskeletal:      Comments: Normal ROM of right foot. No swelling noted. No tenderness. No erythema, warmth. 2+ pedal pulses   Neurological:      Mental Status: She is alert. Current Outpatient Medications   Medication Sig    ferrous sulfate (IRON) 325 mg (65 mg iron) EC tablet Take 1 Tablet by mouth every other day. cholecalciferol (VITAMIN D3) (50,000 UNITS /1250 MCG) capsule Take 50,000 Units by mouth every seven (7) days. traZODone (DESYREL) 50 mg tablet TAKE 1 TABLET BY MOUTH NIGHTLY AS NEEDED FOR SLEEP    norgestimate-ethinyl estradioL (Sprintec, 28,) 0.25-35 mg-mcg tab Take 1 Tablet by mouth daily. No current facility-administered medications for this visit.

## 2022-08-25 NOTE — ASSESSMENT & PLAN NOTE
Autoimmune testing negative  rec discussing with derm
Burning on bottom and top of foot  Works as a , on her feet a lot  Feels crunching sensation when she feels it  Worse during the day  Toes go numb when driving   Requesting referral. Podiatry referral provided.
Ferritin 11. +heavy periods; eats veggies and meat  rec starting iron supplement every other day
Labs reviewed form Dr. Roldan Morse - pt was positive for TPO antibodies. TFTs normal last check. Discussed labs with the patient and her mother  Advised at increased risk for thyroid disease in the future due to antibotides.   Recommend monitoring thyroid studies every 6 months for 2 years then consider yearly and as needed checks
Ongoing for the past 2 years  Normal TFTs  Iron level low b12 folate normal  Taking vitamin D supplements  rec discussing with derm
rec finishing prescription then checking level 2 weeks after last pill to decide next steps
Unable to speak

## 2023-02-05 DIAGNOSIS — G47.01 INSOMNIA DUE TO MEDICAL CONDITION: ICD-10-CM

## 2023-02-06 RX ORDER — TRAZODONE HYDROCHLORIDE 50 MG/1
TABLET ORAL
Qty: 90 TABLET | Refills: 3 | Status: SHIPPED | OUTPATIENT
Start: 2023-02-06

## 2023-03-23 ENCOUNTER — OFFICE VISIT (OUTPATIENT)
Dept: OBGYN CLINIC | Age: 23
End: 2023-03-23

## 2023-03-23 VITALS
SYSTOLIC BLOOD PRESSURE: 121 MMHG | BODY MASS INDEX: 34.67 KG/M2 | WEIGHT: 202 LBS | DIASTOLIC BLOOD PRESSURE: 83 MMHG | HEART RATE: 121 BPM

## 2023-03-23 DIAGNOSIS — R63.5 WEIGHT GAIN: ICD-10-CM

## 2023-03-23 DIAGNOSIS — L68.0 HIRSUTISM: Primary | ICD-10-CM

## 2023-03-23 DIAGNOSIS — R76.8 ANTI-TPO ANTIBODIES PRESENT: ICD-10-CM

## 2023-03-23 DIAGNOSIS — E28.2 PCOS (POLYCYSTIC OVARIAN SYNDROME): ICD-10-CM

## 2023-03-23 NOTE — PROGRESS NOTES
Per Nursing Note:  Patient's last menstrual period was 02/26/2023. Birth Control: abstinence. Last Pap: normal obtained 11/2020     The patient is reporting having:  hairloss, weight gain, fatigue and hair growth on face and neck  for over a   year now. She reports the symptoms are is unchanged. OB/GYN Problem Visit        HPI  Jose Rdoríguez is a No obstetric history on file. ,  20 y.o. female who presents for a problem visit. Patient's last menstrual period was 02/26/2023. LV=11/19/21    Hair thinning (scalp) - 2yrs  Facial hair - chin, jawline, neck. Also abdominal/infraumbilical.  Has to shave. Has had problems with this for 6-7 years, since high school. Has been on OCPs in past, but has not taken for about 3yrs. Over last 1-2years, periods have changed. Not skipping any. Has every month. Has heavy bleeding for 1-2 days, then a few days of spotting. Wears overnight pads, changes every couple of hours. +clots (sm-med). Has cramping for first 1-2 days with the heavier bleeding. Wt gain, has not been able to lose wt. States eating habits and activity levels haven't changed. Review of CC shows  Elevated TPO Ab (6/2022)=161  With nl TSH, FT4 (8/2022)    Lab Results   Component Value Date/Time    HGB 13.2 03/01/2022 03:45 PM     Lab Results   Component Value Date/Time    Ferritin 11 (L) 08/02/2022 10:16 AM           Past Medical History:   Diagnosis Date    ADD (attention deficit disorder)     Anxiety     Depression with anxiety      History reviewed. No pertinent surgical history.   Social History     Occupational History    Not on file   Tobacco Use    Smoking status: Never    Smokeless tobacco: Former     Quit date: 12/1/2019    Tobacco comments:     vaping   Substance and Sexual Activity    Alcohol use: Never    Drug use: Yes     Types: Marijuana     Comment: marijuana daily    Sexual activity: Not Currently     Partners: Male     Family History   Problem Relation Age of Onset Hypertension Father     Heart Disease Maternal Grandmother     Kidney Disease Maternal Grandmother     Uterine Cancer Maternal Grandmother     Hypertension Maternal Grandmother     Heart Disease Maternal Grandfather     Hypertension Maternal Grandfather     Stroke Maternal Grandfather     Hypertension Paternal Grandmother     Heart Attack Paternal Grandfather                Allergies   Allergen Reactions    Clindamycin Hives    Penicillins Hives     Prior to Admission medications    Medication Sig Start Date End Date Taking? Authorizing Provider   traZODone (DESYREL) 50 mg tablet TAKE 1 TABLET BY MOUTH EVERY DAY AT BEDTIME AS NEEDED FOR SLEEP 43  Yes Micaela Carrel, MD   ferrous sulfate (IRON) 325 mg (65 mg iron) EC tablet Take 1 Tablet by mouth every other day.   Yes Micaela Carrel, MD   cholecalciferol (VITAMIN D3) (50,000 UNITS /1250 MCG) capsule Take 50,000 Units by mouth every seven (7) days. Patient not taking: Reported on 3/23/2023 6/30/22   Provider, Historical   norgestimate-ethinyl estradioL (Sprintec, 28,) 0.25-35 mg-mcg tab Take 1 Tablet by mouth daily.   Patient not taking: Reported on 3/23/2023 11/19/21   Hayley Orellana MD            Review of Systems: History obtained from the patient  Constitutional: negative for weight loss, fever, night sweats  Breast: negative for breast lumps, nipple discharge, galactorrhea  GI: negative for change in bowel habits, abdominal pain, black or bloody stools  : negative for frequency, dysuria, hematuria, vaginal discharge  MSK: negative for back pain, joint pain, muscle pain  Skin: negative for itching, rash, hives  Psych: negative for anxiety, depression, change in mood      Objective:  Visit Vitals  /83   Pulse (!) 121   Wt 202 lb (91.6 kg)   LMP 2023   BMI 34.67 kg/m²       Physical Exam:   PHYSICAL EXAMINATION    Constitutional  Appearance: well-nourished, well developed, alert, in no acute distress    HEENT: some hair growth over jawline/neck    Neck  Inspection/Palpation: normal appearance, no masses or tenderness  Lymph Nodes: no lymphadenopathy present  Thyroid: gland size normal, nontender, no nodules or masses present on palpation    Chest  Respiratory Effort: breathing unlabored  Auscultation: normal breath sounds    Cardiovascular  Heart: Auscultation: tachycardic with regular rhythm without murmur      Gastrointestinal  Abdominal Examination: abdomen non-tender to palpation, normal bowel sounds, no masses present; some coarse hair infraumbilical  Liver and spleen: no hepatomegaly present, spleen not palpable  Hernias: no hernias identified      Skin  General Inspection: no rash, no lesions identified    Neurologic/Psychiatric  Mental Status:  Orientation: grossly oriented to person, place and time  Mood and Affect: mood normal, affect appropriate      ASSESSMENT:        ICD-10-CM ICD-9-CM    1. Hirsutism  L68.0 704.1 DHEA SULFATE      SEX HORMONE BINDING GLOBULIN      17-OH PROGESTERONE LCMS      TESTOSTERONE, TOTAL, FEMALE/CHILD      TESTOSTERONE, TOTAL, FEMALE/CHILD      17-OH PROGESTERONE LCMS      SEX HORMONE BINDING GLOBULIN      DHEA SULFATE      2. Weight gain  R63.5 783.1       3. Anti-TPO antibodies present  R76.8 795.79       4. PCOS (polycystic ovarian syndrome)  E28.2 256.4 US TRANSVAGINAL          PLAN:  Labs  US  Discussed dietary changes - sugg Mediterranean diet  Rec she see endocrine  PCOS handout given      Orders Placed This Encounter    US TRANSVAGINAL     Standing Status:   Future     Standing Expiration Date:   6/23/2023     Order Specific Question:   Is Patient Pregnant?      Answer:   No    DHEA SULFATE     Standing Status:   Future     Number of Occurrences:   1     Standing Expiration Date:   9/23/2023    SEX HORMONE BINDING GLOBULIN     Standing Status:   Future     Number of Occurrences:   1     Standing Expiration Date:   9/23/2023    17-OH PROGESTERONE LCMS     Standing Status:   Future Number of Occurrences:   1     Standing Expiration Date:   9/23/2023    TESTOSTERONE, TOTAL, FEMALE/CHILD     Standing Status:   Future     Number of Occurrences:   1     Standing Expiration Date:   3/23/2024

## 2023-03-23 NOTE — PROGRESS NOTES
Moy Gloria is a 20 y.o. female presents for a problem visit. Chief Complaint   Patient presents with    Other     To see if she has PCOS     Patient's last menstrual period was 02/26/2023. Birth Control: abstinence. Last Pap: normal obtained 11/2020    The patient is reporting having:  hairloss, weight gain, fatigue and hair growth on face and neck  for over a   year now. She reports the symptoms are is unchanged.

## 2023-03-25 LAB — SHBG SERPL-SCNC: 20.7 NMOL/L (ref 24.6–122)

## 2023-03-26 LAB — DHEA-S SERPL-MCNC: 262 UG/DL (ref 110–431.7)

## 2023-03-27 PROBLEM — E28.2 PCOS (POLYCYSTIC OVARIAN SYNDROME): Status: ACTIVE | Noted: 2023-03-27

## 2023-03-27 LAB — TESTOST SERPL-MCNC: 43.6 NG/DL (ref 10–55)

## 2023-03-30 DIAGNOSIS — E28.2 PCOS (POLYCYSTIC OVARIAN SYNDROME): Primary | ICD-10-CM

## 2023-04-03 LAB — 17OHP SERPL-MCNC: 51 NG/DL

## 2023-04-18 NOTE — PROGRESS NOTES
Jacky Munson is a 20 y.o. female presents for a problem visit. Chief Complaint   Patient presents with    Hormone Problem     Patient's last menstrual period was 03/01/2023 (approximate). Birth Control: abstinence right now  Last Pap: normal obtained 11/17/20    The patient is here today for an ultrasound to evaluate for PCOS. Ultrasound showed:    TV ULTRASOUND PERFORMED. UTERUS IS ANTEVERTED, NORMAL IN SIZE AND HETEROGENEOUS IN ECHOGENICITY. THE UTERUS APPEARS TO HAVE MULTIPLE STRIATIONS THROUGHOUT WHICH IS SUGGESTIVE OF  ADENOMYOSIS. THE ENDOMETRIUM APPEARS HETEROGENEOUS AND MEASURES 13-14MM IN THICKNESS. BLOODFLOW IS  PRESENT. A POLYP OR MASS CANNOT BE RULED OUT. RIGHT OVARY APPEARS WITHIN NORMAL LIMITS. LEFT OVARY APPEARS WITHIN NORMAL LIMITS. NO FREE FLUID SEEN IN THE CDS.

## 2023-04-19 ENCOUNTER — OFFICE VISIT (OUTPATIENT)
Dept: OBGYN CLINIC | Age: 23
End: 2023-04-19

## 2023-04-19 VITALS
BODY MASS INDEX: 33.81 KG/M2 | HEART RATE: 90 BPM | DIASTOLIC BLOOD PRESSURE: 83 MMHG | SYSTOLIC BLOOD PRESSURE: 136 MMHG | WEIGHT: 197 LBS

## 2023-04-19 DIAGNOSIS — R93.5 ABNORMAL ENDOMETRIAL ULTRASOUND: Primary | ICD-10-CM

## 2023-04-19 PROCEDURE — 99213 OFFICE O/P EST LOW 20 MIN: CPT | Performed by: OBSTETRICS & GYNECOLOGY

## 2023-04-19 RX ORDER — MEDROXYPROGESTERONE ACETATE 10 MG/1
10 TABLET ORAL DAILY
Qty: 10 TABLET | Refills: 0 | Status: SHIPPED | OUTPATIENT
Start: 2023-04-19 | End: 2023-04-29

## 2023-04-19 NOTE — PROGRESS NOTES
Per Nursing Note:  Allyson Kearns is a 20 y.o. female presents for a problem visit. Chief Complaint   Patient presents with    Hormone Problem      Patient's last menstrual period was 03/01/2023 (approximate). Birth Control: abstinence right now  Last Pap: normal obtained 11/17/20     The patient is here today for an ultrasound to evaluate for PCOS. Ultrasound showed:     TV ULTRASOUND PERFORMED. UTERUS IS ANTEVERTED, NORMAL IN SIZE AND HETEROGENEOUS IN ECHOGENICITY. THE UTERUS APPEARS TO HAVE MULTIPLE STRIATIONS THROUGHOUT WHICH IS SUGGESTIVE OF  ADENOMYOSIS. THE ENDOMETRIUM APPEARS HETEROGENEOUS AND MEASURES 13-14MM IN THICKNESS. BLOODFLOW IS  PRESENT. A POLYP OR MASS CANNOT BE RULED OUT. RIGHT OVARY APPEARS WITHIN NORMAL LIMITS. LEFT OVARY APPEARS WITHIN NORMAL LIMITS. NO FREE FLUID SEEN IN THE CDS. OB/GYN Problem Visit        HPI  Allyson Kearns is a No obstetric history on file. ,  20 y.o. female who presents for a problem visit. Patient's last menstrual period was 03/01/2023 (approximate). Pt seen 3/23/23 hor hirsutisim, wt gain, ? PCOS. Also anti-TPO Ab.    labs (3/23/23)  TT=43.6  SHBG=20.7  DHEA-S=262  17-OHP=51    Labs show total testosterone in upper nl range, SHBG mildly decr, c/w PCOS. Was advised to f/up with endo, has appt with Dr. Thaddeus Magallanes for May. Also, h/o Vit D def. Was followed by her PCP. Has appt to est care with new PCP, Dr. Raphael Knapp, since her current PCP, Dr. Danell Holter, will be moving out of state. US today shows nl ovaries. However, new finding of thickened endometrium, ?polyp. Missed her period this month, LMP=3/1/23  (No chance of pregnancy, has not been SA for ~8 months)      TV ULTRASOUND PERFORMED. UTERUS IS ANTEVERTED, NORMAL IN SIZE AND HETEROGENEOUS IN ECHOGENICITY. THE UTERUS APPEARS TO HAVE MULTIPLE STRIATIONS THROUGHOUT WHICH IS SUGGESTIVE OF  ADENOMYOSIS. THE ENDOMETRIUM APPEARS HETEROGENEOUS AND MEASURES 13-14MM IN THICKNESS. BLOODFLOW IS  PRESENT. A POLYP OR MASS CANNOT BE RULED OUT. RIGHT OVARY APPEARS WITHIN NORMAL LIMITS. LEFT OVARY APPEARS WITHIN NORMAL LIMITS. NO FREE FLUID SEEN IN THE CDS. Past Medical History:   Diagnosis Date    ADD (attention deficit disorder)     Anxiety     Depression with anxiety     PCOS (polycystic ovarian syndrome) 2023     History reviewed. No pertinent surgical history. Social History     Occupational History    Not on file   Tobacco Use    Smoking status: Never    Smokeless tobacco: Former     Quit date: 2019    Tobacco comments:     vaping   Substance and Sexual Activity    Alcohol use: Never    Drug use: Yes     Types: Marijuana     Comment: marijuana daily    Sexual activity: Not Currently     Partners: Male     Family History   Problem Relation Age of Onset    Hypertension Father     Heart Disease Maternal Grandmother     Kidney Disease Maternal Grandmother     Uterine Cancer Maternal Grandmother     Hypertension Maternal Grandmother     Heart Disease Maternal Grandfather     Hypertension Maternal Grandfather     Stroke Maternal Grandfather     Hypertension Paternal Grandmother     Heart Attack Paternal Grandfather                Allergies   Allergen Reactions    Clindamycin Hives    Penicillins Hives     Prior to Admission medications    Medication Sig Start Date End Date Taking? Authorizing Provider   medroxyPROGESTERone (PROVERA) 10 mg tablet Take 1 Tablet by mouth daily for 10 days. 23 Yes Yashira Rushing MD   traZODone (DESYREL) 50 mg tablet TAKE 1 TABLET BY MOUTH EVERY DAY AT BEDTIME AS NEEDED FOR SLEEP 70  Yes Patrick Clancy MD   ferrous sulfate (IRON) 325 mg (65 mg iron) EC tablet Take 1 Tablet by mouth every other day.    Yes Patrick Clancy MD                Objective:  Visit Vitals  /83   Pulse 90   Wt 197 lb (89.4 kg)   LMP 2023 (Approximate)   BMI 33.81 kg/m²       Physical Exam:   PHYSICAL EXAMINATION    Constitutional  Appearance: well-nourished, well developed, alert, in no acute distress        Skin  General Inspection: no rash, no lesions identified    Neurologic/Psychiatric  Mental Status:  Orientation: grossly oriented to person, place and time  Mood and Affect: mood normal, affect appropriate      ASSESSMENT:  PCOS  Abnl endometrium on US, thickened, ?polyp  Elevated TPO Ab  H/o Vit D def      ICD-10-CM ICD-9-CM    1. Abnormal endometrial ultrasound  R93.5 793.5 US HYSTEROSONOGRAPHY          PLAN:  Provera 10mg x10d, CD#[id number], then SIS. Keep endo appt  Keep PCP appt      Orders Placed This Encounter    US HYSTEROSONOGRAPHY     Standing Status:   Future     Standing Expiration Date:   7/19/2023     Order Specific Question:   Is Patient Pregnant? Answer:   No    medroxyPROGESTERone (PROVERA) 10 mg tablet     Sig: Take 1 Tablet by mouth daily for 10 days.      Dispense:  10 Tablet     Refill:  0

## 2023-05-30 ENCOUNTER — PROCEDURE VISIT (OUTPATIENT)
Age: 23
End: 2023-05-30

## 2023-05-30 VITALS
SYSTOLIC BLOOD PRESSURE: 120 MMHG | DIASTOLIC BLOOD PRESSURE: 81 MMHG | HEART RATE: 88 BPM | BODY MASS INDEX: 33.81 KG/M2 | WEIGHT: 197 LBS

## 2023-05-30 DIAGNOSIS — Z87.42 HISTORY OF MENORRHAGIA: Primary | ICD-10-CM

## 2023-05-30 RX ORDER — SPIRONOLACTONE 25 MG/1
TABLET ORAL
COMMUNITY
Start: 2023-05-25

## 2023-05-30 RX ORDER — ESCITALOPRAM OXALATE 20 MG/1
20 TABLET ORAL DAILY
COMMUNITY
Start: 2023-05-03

## 2023-05-30 NOTE — PROGRESS NOTES
OB/GYN Problem Visit        HPI  Destin Andres is a No obstetric history on file. ,  20 y.o. female who presents for a problem visit. Patient's last menstrual period was 2023. Pt seen March for menorrhagia, hirsutism  US  showed thickened endometrium. Given Provera, advised to RTO for SIS. Period started spontaneously on d#7/10, so stopped Provera. Has not had any AUB since then. US today shows nl endometrium, 5-6mm,     TRANSVAGINAL ULTRASOUND PERFORMED  UTERUS IS ANTEVERTED, NORMAL IN SIZE AND ECHOGENICITY. ENDOMETRIUM MEASURES 5-6MM IN THICKNESS. NO EVIDENCE OF MASSES OR ABNORMALITIES ARE SEEN. THE SIS WAS NOT PERFORMED. RIGHT OVARY APPEARS WITHIN NORMAL LIMITS. MULTIPLE FOLLICLES ARE SEEN. LEFT OVARY APPEARS WITHIN NORMAL LIMITS. MULTIPLE FOLLICLES ARE SEEN. NO FREE FLUID SEEN IN THE CDS. Past Medical History:   Diagnosis Date    ADD (attention deficit disorder)     Anxiety     Depression with anxiety     PCOS (polycystic ovarian syndrome) 2023     History reviewed. No pertinent surgical history. OB History   No obstetric history on file.        Social History     Occupational History    Not on file   Tobacco Use    Smoking status: Never    Smokeless tobacco: Former     Quit date: 2019    Tobacco comments:     Quit smoking: vaping   Substance and Sexual Activity    Alcohol use: Never    Drug use: Yes     Types: Marijuana Lorriane Barney)    Sexual activity: Not on file     Family History   Problem Relation Age of Onset    Hypertension Father     Hypertension Paternal Grandmother     Stroke Maternal Grandfather     Heart Attack Paternal Grandfather             Heart Disease Maternal Grandmother     Kidney Disease Maternal Grandmother     Uterine Cancer Maternal Grandmother     Hypertension Maternal Grandmother     Heart Disease Maternal Grandfather     Hypertension Maternal Grandfather        Allergies   Allergen Reactions    Clindamycin Hives    Penicillins

## 2023-05-30 NOTE — PROGRESS NOTES
Fransico Caraballo is a 20 y.o. female presents for a problem visit. Chief Complaint   Patient presents with    Irregular Menses     Patient's last menstrual period was 05/10/2023. Birth Control: OCP (estrogen/progesterone). Last Pap: normal obtained 11/2020. The patient was here to have an SIS for abnormal bleeding.

## 2023-06-19 ENCOUNTER — OFFICE VISIT (OUTPATIENT)
Facility: CLINIC | Age: 23
End: 2023-06-19
Payer: COMMERCIAL

## 2023-06-19 VITALS
SYSTOLIC BLOOD PRESSURE: 120 MMHG | DIASTOLIC BLOOD PRESSURE: 82 MMHG | RESPIRATION RATE: 16 BRPM | HEIGHT: 63 IN | TEMPERATURE: 98 F | HEART RATE: 76 BPM | WEIGHT: 192 LBS | BODY MASS INDEX: 34.02 KG/M2

## 2023-06-19 DIAGNOSIS — F51.04 PSYCHOPHYSIOLOGICAL INSOMNIA: ICD-10-CM

## 2023-06-19 DIAGNOSIS — E55.9 VITAMIN D DEFICIENCY: ICD-10-CM

## 2023-06-19 DIAGNOSIS — F51.5 NIGHTMARES ASSOCIATED WITH CHRONIC POST-TRAUMATIC STRESS DISORDER: ICD-10-CM

## 2023-06-19 DIAGNOSIS — E28.2 PCOS (POLYCYSTIC OVARIAN SYNDROME): ICD-10-CM

## 2023-06-19 DIAGNOSIS — F41.9 ANXIETY: ICD-10-CM

## 2023-06-19 DIAGNOSIS — F43.12 NIGHTMARES ASSOCIATED WITH CHRONIC POST-TRAUMATIC STRESS DISORDER: ICD-10-CM

## 2023-06-19 DIAGNOSIS — F33.40 RECURRENT MAJOR DEPRESSIVE DISORDER, IN REMISSION (HCC): Primary | ICD-10-CM

## 2023-06-19 PROBLEM — G47.00 INSOMNIA: Status: ACTIVE | Noted: 2021-06-17

## 2023-06-19 PROCEDURE — 99204 OFFICE O/P NEW MOD 45 MIN: CPT | Performed by: FAMILY MEDICINE

## 2023-06-19 RX ORDER — PRAZOSIN HYDROCHLORIDE 1 MG/1
CAPSULE ORAL
Qty: 367 CAPSULE | Refills: 0 | Status: SHIPPED | OUTPATIENT
Start: 2023-06-19 | End: 2023-09-07

## 2023-06-19 ASSESSMENT — PATIENT HEALTH QUESTIONNAIRE - PHQ9
6. FEELING BAD ABOUT YOURSELF - OR THAT YOU ARE A FAILURE OR HAVE LET YOURSELF OR YOUR FAMILY DOWN: 2
4. FEELING TIRED OR HAVING LITTLE ENERGY: 3
SUM OF ALL RESPONSES TO PHQ9 QUESTIONS 1 & 2: 3
2. FEELING DOWN, DEPRESSED OR HOPELESS: 1
3. TROUBLE FALLING OR STAYING ASLEEP: 3
7. TROUBLE CONCENTRATING ON THINGS, SUCH AS READING THE NEWSPAPER OR WATCHING TELEVISION: 1
9. THOUGHTS THAT YOU WOULD BE BETTER OFF DEAD, OR OF HURTING YOURSELF: 0
SUM OF ALL RESPONSES TO PHQ QUESTIONS 1-9: 16
SUM OF ALL RESPONSES TO PHQ QUESTIONS 1-9: 16
1. LITTLE INTEREST OR PLEASURE IN DOING THINGS: 2
5. POOR APPETITE OR OVEREATING: 1
SUM OF ALL RESPONSES TO PHQ QUESTIONS 1-9: 16
8. MOVING OR SPEAKING SO SLOWLY THAT OTHER PEOPLE COULD HAVE NOTICED. OR THE OPPOSITE, BEING SO FIGETY OR RESTLESS THAT YOU HAVE BEEN MOVING AROUND A LOT MORE THAN USUAL: 3
SUM OF ALL RESPONSES TO PHQ QUESTIONS 1-9: 16

## 2023-06-19 NOTE — PROGRESS NOTES
multiple awakenings during the night that reduces sleep quality. Wondering about options to treat nightmares. Is seeing a therapist already. Assessment & Plan:   Uncontrolled, continue above medications (trazadone), and incorporate prazosin for nightmares. Counseled on reason for medication being functional debility and effects of sleep on mood. Start with small dosage and watch for signs of low blood pressure. FOllow up in 6-8 weeks. Max dosage 15mg nightly prazosin though normally effective at 5mg  4. Vitamin D deficiency  Overview:  Lab Results   Component Value Date/Time    VITD25 64.0 08/02/2022 10:16 AM      Medication: 50,000IU weekly    Interval Hx: last vitamin d level taken only after 8 weeks on 50,000IU weekly. Stopped then restarted after recheck at outside endo appointment revealed return to low levels. Now on for 4 weeks. Following with endocrine. Assessment & Plan:   Monitored by specialist- no acute findings meriting change in the plan  5. PCOS (polycystic ovarian syndrome)  Overview:  labs (3/23/23)  TT=43.6  SHBG=20.7  DHEA-S=262    Medications: spironolactone 25mg daily    Interval Hx: followed by endocrinology. Assessment & Plan:   Monitored by specialist- no acute findings meriting change in the plan  6. Nightmares associated with chronic post-traumatic stress disorder  Overview:  See insomnia overview  Assessment & Plan:   see insomnia assessment/plan       OBJECTIVE  /82   Pulse 76   Temp 98 °F (36.7 °C)   Resp 16   Ht 5' 3\" (1.6 m)   Wt 192 lb (87.1 kg)   LMP 04/25/2023   BMI 34.01 kg/m²     Physical Exam    No results found for any visits on 06/19/23. HISTORICAL  Reviewed and updated today, and as noted below:    Past Medical History:   Diagnosis Date    ADD (attention deficit disorder)     Anxiety     Depression with anxiety     PCOS (polycystic ovarian syndrome) 03/2023     No past surgical history on file.   Family History   Problem Relation Age of Onset    Hypertension

## 2023-06-19 NOTE — ASSESSMENT & PLAN NOTE
Uncontrolled, continue above medications (trazadone), and incorporate prazosin for nightmares. Counseled on reason for medication being functional debility and effects of sleep on mood. Start with small dosage and watch for signs of low blood pressure. FOllow up in 6-8 weeks.  Max dosage 15mg nightly prazosin though normally effective at 5mg

## 2023-07-17 ENCOUNTER — OFFICE VISIT (OUTPATIENT)
Age: 23
End: 2023-07-17
Payer: COMMERCIAL

## 2023-07-17 VITALS
BODY MASS INDEX: 34.54 KG/M2 | HEART RATE: 81 BPM | DIASTOLIC BLOOD PRESSURE: 86 MMHG | WEIGHT: 195 LBS | SYSTOLIC BLOOD PRESSURE: 123 MMHG

## 2023-07-17 DIAGNOSIS — N92.0 MENORRHAGIA WITH REGULAR CYCLE: Primary | ICD-10-CM

## 2023-07-17 PROCEDURE — 99212 OFFICE O/P EST SF 10 MIN: CPT | Performed by: OBSTETRICS & GYNECOLOGY

## 2023-07-17 NOTE — PROGRESS NOTES
Per Nursing Note:     Elvi Briones is a 24 y.o. female presents for a problem visit. Chief Complaint   Patient presents with    Contraception      Patient's last menstrual period was 07/03/2023. Birth Control: none. Last Pap: normal obtained 11/2020. The patient is here to discuss birth control options              OB/GYN Problem Visit        HPI  Elvi Briones is a No obstetric history on file. ,  24 y.o. female who presents for a problem visit. Patient's last menstrual period was 07/03/2023. LV=5/30/23     Pt seen March for menorrhagia, hirsutism  US 4/19 showed thickened endometrium. Given Provera, advised to RTO for SIS. Period started spontaneously on d#7/10, so stopped Provera. Has not had any AUB since then. US today shows nl endometrium, 5-6mm,      TRANSVAGINAL ULTRASOUND PERFORMED  UTERUS IS ANTEVERTED, NORMAL IN SIZE AND ECHOGENICITY. ENDOMETRIUM MEASURES 5-6MM IN THICKNESS. NO EVIDENCE OF MASSES OR ABNORMALITIES ARE SEEN. THE SIS WAS NOT PERFORMED. RIGHT OVARY APPEARS WITHIN NORMAL LIMITS. MULTIPLE FOLLICLES ARE SEEN. LEFT OVARY APPEARS WITHIN NORMAL LIMITS. MULTIPLE FOLLICLES ARE SEEN. NO FREE FLUID SEEN IN THE CDS. Declined RX for cycles, opted to monitor. Cycles fairly regular, every 4-5d  Has heavier bleeding first 1-2d, wears overnight pads, changes every 3-4 hours. Small clots  Some cramping, on first days. Ibfn, heating pad  Denies PMS  sx. Is interested in IUD. Past Medical History:   Diagnosis Date    ADD (attention deficit disorder)     Anxiety     Depression with anxiety     PCOS (polycystic ovarian syndrome) 03/2023     History reviewed. No pertinent surgical history. OB History   No obstetric history on file.        Social History     Occupational History    Not on file   Tobacco Use    Smoking status: Never    Smokeless tobacco: Former     Quit date: 12/1/2019    Tobacco comments:     Quit smoking: vaping   Vaping Use    Vaping Use:

## 2023-07-17 NOTE — PROGRESS NOTES
Saul Melton is a 24 y.o. female presents for a problem visit. Chief Complaint   Patient presents with    Contraception     Patient's last menstrual period was 07/03/2023. Birth Control: none. Last Pap: normal obtained 11/2020.     The patient is here to discuss birth control options

## 2023-08-16 NOTE — TELEPHONE ENCOUNTER
24year old patient last seen in the office on 7/17/2023     Patient is scheduled for iud insertion on 8/28/2023    ?  Need refill has sent     Please advise    Thank you

## 2023-08-17 DIAGNOSIS — E61.1 IRON DEFICIENCY: ICD-10-CM

## 2023-08-17 RX ORDER — MEDROXYPROGESTERONE ACETATE 10 MG/1
TABLET ORAL
Qty: 10 TABLET | OUTPATIENT
Start: 2023-08-17

## 2023-08-17 RX ORDER — LANOLIN ALCOHOL/MO/W.PET/CERES
325 CREAM (GRAM) TOPICAL EVERY OTHER DAY
Qty: 45 TABLET | Refills: 3 | OUTPATIENT
Start: 2023-08-17

## 2023-09-05 ENCOUNTER — OFFICE VISIT (OUTPATIENT)
Facility: CLINIC | Age: 23
End: 2023-09-05

## 2023-09-05 VITALS
WEIGHT: 198 LBS | TEMPERATURE: 97 F | HEIGHT: 63 IN | SYSTOLIC BLOOD PRESSURE: 117 MMHG | DIASTOLIC BLOOD PRESSURE: 75 MMHG | BODY MASS INDEX: 35.08 KG/M2 | HEART RATE: 85 BPM | OXYGEN SATURATION: 99 % | RESPIRATION RATE: 16 BRPM

## 2023-09-05 DIAGNOSIS — F51.04 PSYCHOPHYSIOLOGICAL INSOMNIA: ICD-10-CM

## 2023-09-05 DIAGNOSIS — F33.40 RECURRENT MAJOR DEPRESSIVE DISORDER, IN REMISSION (HCC): ICD-10-CM

## 2023-09-05 DIAGNOSIS — F41.9 ANXIETY: ICD-10-CM

## 2023-09-05 DIAGNOSIS — F51.5 NIGHTMARES ASSOCIATED WITH CHRONIC POST-TRAUMATIC STRESS DISORDER: Primary | ICD-10-CM

## 2023-09-05 DIAGNOSIS — F43.12 NIGHTMARES ASSOCIATED WITH CHRONIC POST-TRAUMATIC STRESS DISORDER: Primary | ICD-10-CM

## 2023-09-05 PROCEDURE — 99214 OFFICE O/P EST MOD 30 MIN: CPT | Performed by: FAMILY MEDICINE

## 2023-09-05 RX ORDER — PRAZOSIN HYDROCHLORIDE 1 MG/1
1 CAPSULE ORAL NIGHTLY
Qty: 90 CAPSULE | Refills: 0 | COMMUNITY
Start: 2023-09-05

## 2023-09-05 RX ORDER — METFORMIN HYDROCHLORIDE 500 MG/1
TABLET, EXTENDED RELEASE ORAL
COMMUNITY
Start: 2023-07-24

## 2023-09-05 ASSESSMENT — PATIENT HEALTH QUESTIONNAIRE - PHQ9
SUM OF ALL RESPONSES TO PHQ QUESTIONS 1-9: 0
SUM OF ALL RESPONSES TO PHQ QUESTIONS 1-9: 0
1. LITTLE INTEREST OR PLEASURE IN DOING THINGS: 0
SUM OF ALL RESPONSES TO PHQ QUESTIONS 1-9: 0
SUM OF ALL RESPONSES TO PHQ9 QUESTIONS 1 & 2: 0
2. FEELING DOWN, DEPRESSED OR HOPELESS: 0
SUM OF ALL RESPONSES TO PHQ QUESTIONS 1-9: 0

## 2023-09-05 NOTE — PROGRESS NOTES
ASSESSMENT/PLAN:  Ms. Chad Chaudhary is a 24 y.o. female established patient who presents for Follow-up (Med check doing well /)  , found to have the followin. Nightmares associated with chronic post-traumatic stress disorder  2. Psychophysiological insomnia  Assessment & Plan:   Well-controlled, continue current medications and continue current treatment plan and wean trazodone when able. 3. Anxiety  Assessment & Plan:   Well-controlled, continue current medications and continue current treatment plan  4. Recurrent major depressive disorder, in remission [facility])  Assessment & Plan:  Well controlled. At goal. Keep sleep going, keep therapy going. Continue medications for now, but as tolerated try weaning off trazodone for less serotonergic activity. It was a pleasure seeing Ms. Chad Chaudhary today. Return in about 3 months (around 2023) for mood management. SUBJECTIVE:     HPI  Ms. Chad Chaudhary is a 24 y.o. female established patient who presents for the following concerns:    1. Nightmares associated with chronic post-traumatic stress disorder  Overview:  See insomnia overview  2. Psychophysiological insomnia  Overview:  Onset and Middle insomnia 2/2 nightmare disorder. Medication: Trazodone 50mg nightly prn, (prazosin 1mg nightly for nightmares). Interval Hx: Trazodone effective for onset part of insomnia, and nightmares well controlled on low dose prazosin. Not scary and staying asleep. 3. Anxiety  Overview:  See depression overview. 4. Recurrent major depressive disorder, in remission [facility])  Overview:  PHQ-9 Total Score: 0 (2023  8:13 AM)    Background: hx of traumas multiple, co-morbid anxiety, PTSD, nightmares. Medications: Lexapro 20mg daily, trazodone 50mg nightly prn    Interval Hx: mood is much improved despite the stressors; sleeping much better . No SI/HI or hallucinations.         The following portions of the patient's history were reviewed

## 2023-09-05 NOTE — ASSESSMENT & PLAN NOTE
Well controlled. At goal. Keep sleep going, keep therapy going. Continue medications for now, but as tolerated try weaning off trazodone for less serotonergic activity.

## 2023-09-05 NOTE — ASSESSMENT & PLAN NOTE
Well-controlled, continue current medications and continue current treatment plan and wean trazodone when able.

## 2023-11-10 RX ORDER — ESCITALOPRAM OXALATE 20 MG/1
20 TABLET ORAL DAILY
Qty: 90 TABLET | Refills: 0 | Status: SHIPPED | OUTPATIENT
Start: 2023-11-10

## 2023-11-10 NOTE — TELEPHONE ENCOUNTER
----- Message from Thersa Bernheim sent at 11/10/2023 12:57 PM EST -----  Subject: Refill Request    QUESTIONS  Name of Medication? escitalopram (LEXAPRO) 20 MG tablet  Patient-reported dosage and instructions? 20 MG Once daily   How many days do you have left? 0  Preferred Pharmacy? [facility] [address] [facility] phone number (if available)? [phone]  Additional Information for Provider? Patient has been attempting to   contact the practice for over a week she has been out of this medication   for a few days. The pharmacy is also been attempting to contact the   practice with no success. Please call patient asap   ---------------------------------------------------------------------------  --------------  CALL BACK INFO  What is the best way for the office to contact you? OK to leave message on   voicemail  Preferred Call Back Phone Number? [phone]  ---------------------------------------------------------------------------  --------------  SCRIPT ANSWERS  Relationship to Patient?  Self

## 2024-02-06 RX ORDER — ESCITALOPRAM OXALATE 20 MG/1
20 TABLET ORAL DAILY
Qty: 90 TABLET | Refills: 1 | Status: SHIPPED | OUTPATIENT
Start: 2024-02-06

## 2024-02-21 NOTE — PROGRESS NOTES
Payal Chan is a 23 y.o. female returns for an annual exam     Chief Complaint   Patient presents with    Annual Exam       Patient's last menstrual period was 02/22/2024 (exact date).  Her periods are moderate in flow and usually regular with a 26-32 day interval with 3-7 day duration.  She does not have dysmenorrhea.  Problems: no problems  Birth Control: condoms .  Last Pap: today will be her first  With regard to the Gardisil vaccine, she has received all 3 injections

## 2024-02-26 ENCOUNTER — OFFICE VISIT (OUTPATIENT)
Age: 24
End: 2024-02-26
Payer: COMMERCIAL

## 2024-02-26 VITALS
SYSTOLIC BLOOD PRESSURE: 123 MMHG | DIASTOLIC BLOOD PRESSURE: 75 MMHG | BODY MASS INDEX: 34.01 KG/M2 | WEIGHT: 192 LBS | HEART RATE: 71 BPM

## 2024-02-26 DIAGNOSIS — Z12.4 SCREENING FOR CERVICAL CANCER: ICD-10-CM

## 2024-02-26 DIAGNOSIS — Z01.419 ENCOUNTER FOR WELL WOMAN EXAM WITH ROUTINE GYNECOLOGICAL EXAM: Primary | ICD-10-CM

## 2024-02-26 DIAGNOSIS — Z11.3 SCREEN FOR STD (SEXUALLY TRANSMITTED DISEASE): ICD-10-CM

## 2024-02-26 PROCEDURE — 99395 PREV VISIT EST AGE 18-39: CPT | Performed by: OBSTETRICS & GYNECOLOGY

## 2024-02-29 LAB
C TRACH RRNA SPEC QL NAA+PROBE: POSITIVE
N GONORRHOEA RRNA SPEC QL NAA+PROBE: NEGATIVE
SPECIMEN STATUS REPORT: NORMAL
T VAGINALIS RRNA SPEC QL NAA+PROBE: NEGATIVE

## 2024-02-29 RX ORDER — DOXYCYCLINE 100 MG/1
100 TABLET ORAL 2 TIMES DAILY
Qty: 14 TABLET | Refills: 0 | Status: SHIPPED | OUTPATIENT
Start: 2024-02-29 | End: 2024-03-07

## 2024-03-01 ENCOUNTER — TELEPHONE (OUTPATIENT)
Age: 24
End: 2024-03-01

## 2024-03-01 NOTE — TELEPHONE ENCOUNTER
Has not had STI testing in our office since 11/19/21 (which was negative).  No way to know exactly when she was infected with the chlamydia.  Possible she could have gotten it from a previous partner.  Possible the other woman could have gotten gonorrhea from a previous partner.    STI may not be transmitted with the first exposure.  Would recommend that her partner be tested.    Her swab was negative for gonorrhea.    Recommend that she be retested in 3 months.  Recommend she use condoms to decrease risk of STI transmission (although this does not make the risk \"0\").

## 2024-03-01 NOTE — TELEPHONE ENCOUNTER
Two patient identifiers used      23 year old patient last seen in the office 2/26/2024    Patient is aware of her positive STI    Component  Ref Range & Units    Chlamydia trachomatis, SULLY  Negative Positive Abnormal        Patient is picking up her medication today.    Patient is calling to say that she is \"talking to this dude\" and another girl is also \"talking to this same dude\" and the other girl is positive for Neisseria Gonorrhoeae      Patient reports the dude is getting tested next week for STI .    Patient is wondering how she and the other girl have different STI        Please advise    Thank you

## 2024-03-02 LAB
., LABCORP: NORMAL
CYTOLOGIST CVX/VAG CYTO: NORMAL
CYTOLOGY CVX/VAG DOC CYTO: NORMAL
CYTOLOGY CVX/VAG DOC THIN PREP: NORMAL
DX ICD CODE: NORMAL
Lab: NORMAL
OTHER STN SPEC: NORMAL
STAT OF ADQ CVX/VAG CYTO-IMP: NORMAL

## 2024-03-04 ENCOUNTER — PATIENT MESSAGE (OUTPATIENT)
Age: 24
End: 2024-03-04

## 2024-03-04 NOTE — TELEPHONE ENCOUNTER
Patient was called back and advised of MD recommendations and was advised of where partner can get STI testing( health department, PCP , urgent care)    Patient verbalized understanding and partner to get tested

## 2024-03-04 NOTE — TELEPHONE ENCOUNTER
This nurse attempted to reach the patient and left a detailed message for the patient to call the office back to discuss MD recomendation

## 2024-03-04 NOTE — TELEPHONE ENCOUNTER
From: Payal Cahn  To: Dr. Mary Isaac  Sent: 3/4/2024 9:23 AM EST  Subject: Question     Hey, I called Friday to ask a question and someone was going to get back to me. They called me this morning but I was working so stepped away and got put on hold but I had to go back to work so I was wondering if you or the nurse can just message me in my chart to make it easier.   Thank you,   Payal Chan

## 2024-05-09 NOTE — PROGRESS NOTES
Payal Chan is a 23 y.o. female presents for a problem visit.    No chief complaint on file.    No LMP recorded.  Birth Control: {contraception:842194}.  Last Pap: normal obtained 2/26/24    The patient is here for a DAPHNEY of chlamydia.

## 2024-05-13 NOTE — PROGRESS NOTES
Payal Chan is a 23 y.o. female presents for a problem visit.    No chief complaint on file.    Patient's last menstrual period was 05/01/2024 (approximate).  Birth Control: none.  Last Pap: normal obtained 2/26/24    The patient is here for a DAPHNEY of chlamydia.

## 2024-05-14 ENCOUNTER — OFFICE VISIT (OUTPATIENT)
Age: 24
End: 2024-05-14
Payer: COMMERCIAL

## 2024-05-14 VITALS
SYSTOLIC BLOOD PRESSURE: 120 MMHG | WEIGHT: 202 LBS | DIASTOLIC BLOOD PRESSURE: 79 MMHG | HEIGHT: 63 IN | BODY MASS INDEX: 35.79 KG/M2

## 2024-05-14 DIAGNOSIS — Z11.3 SCREEN FOR STD (SEXUALLY TRANSMITTED DISEASE): Primary | ICD-10-CM

## 2024-05-14 DIAGNOSIS — Z86.19 HISTORY OF CHLAMYDIA: ICD-10-CM

## 2024-05-14 PROCEDURE — 99212 OFFICE O/P EST SF 10 MIN: CPT | Performed by: OBSTETRICS & GYNECOLOGY

## 2024-05-14 NOTE — PROGRESS NOTES
OB/GYN Problem Visit        Chief Complaint   Patient presents with    Follow-up     chlamydia       HPI  Payal Chan is a ,  23 y.o. female who presents for a problem visit.   Patient's last menstrual period was 2024 (approximate).    +chlam 24  DAPHNEY today.  No c/o            Past Medical History:   Diagnosis Date    ADD (attention deficit disorder)     Anxiety     Chlamydia 2024    Depression with anxiety     HPV vaccine counseling     completed series    PCOS (polycystic ovarian syndrome) 2023    Routine Papanicolaou smear 2024    normal     No past surgical history on file.  OB History    Para Term  AB Living   0 0 0 0 0 0   SAB IAB Ectopic Molar Multiple Live Births   0 0 0 0 0 0       Social History     Occupational History    Not on file   Tobacco Use    Smoking status: Never    Smokeless tobacco: Former     Quit date: 2019    Tobacco comments:     Quit smoking: vaping   Vaping Use    Vaping Use: Never used   Substance and Sexual Activity    Alcohol use: Never    Drug use: Yes     Types: Marijuana (Weed)    Sexual activity: Yes     Partners: Male     Birth control/protection: None     Family History   Problem Relation Age of Onset    Hypertension Father     Hypertension Paternal Grandmother     Stroke Maternal Grandfather     Heart Attack Paternal Grandfather             Heart Disease Maternal Grandmother     Kidney Disease Maternal Grandmother     Uterine Cancer Maternal Grandmother     Hypertension Maternal Grandmother     Heart Disease Maternal Grandfather     Hypertension Maternal Grandfather        Allergies   Allergen Reactions    Clindamycin Hives    Penicillins Hives     Prior to Admission medications    Medication Sig Start Date End Date Taking? Authorizing Provider   escitalopram (LEXAPRO) 20 MG tablet TAKE 1 TABLET BY MOUTH EVERY DAY 24  Yes Theo Mederos MD   metFORMIN (GLUCOPHAGE-XR) 500 MG extended release tablet TAKE

## 2024-05-16 LAB
C TRACH RRNA SPEC QL NAA+PROBE: NEGATIVE
N GONORRHOEA RRNA SPEC QL NAA+PROBE: NEGATIVE
T VAGINALIS RRNA SPEC QL NAA+PROBE: NEGATIVE

## 2024-06-28 ENCOUNTER — TELEPHONE (OUTPATIENT)
Age: 24
End: 2024-06-28

## 2024-06-28 ENCOUNTER — OFFICE VISIT (OUTPATIENT)
Age: 24
End: 2024-06-28

## 2024-06-28 ENCOUNTER — TELEPHONE (OUTPATIENT)
Facility: CLINIC | Age: 24
End: 2024-06-28

## 2024-06-28 VITALS
TEMPERATURE: 98.3 F | OXYGEN SATURATION: 99 % | DIASTOLIC BLOOD PRESSURE: 80 MMHG | HEART RATE: 71 BPM | WEIGHT: 205.8 LBS | SYSTOLIC BLOOD PRESSURE: 122 MMHG | BODY MASS INDEX: 36.46 KG/M2

## 2024-06-28 DIAGNOSIS — N30.01 ACUTE CYSTITIS WITH HEMATURIA: ICD-10-CM

## 2024-06-28 DIAGNOSIS — R82.90 URINE ABNORMALITY: Primary | ICD-10-CM

## 2024-06-28 LAB
BILIRUBIN, URINE, POC: NEGATIVE
BLOOD URINE, POC: ABNORMAL
GLUCOSE URINE, POC: NEGATIVE
KETONES, URINE, POC: ABNORMAL
LEUKOCYTE ESTERASE, URINE, POC: ABNORMAL
NITRITE, URINE, POC: NEGATIVE
PH, URINE, POC: 6 (ref 4.6–8)
PROTEIN,URINE, POC: ABNORMAL
SPECIFIC GRAVITY, URINE, POC: 1.02 (ref 1–1.03)
URINALYSIS CLARITY, POC: ABNORMAL
URINALYSIS COLOR, POC: ABNORMAL
UROBILINOGEN, POC: NORMAL

## 2024-06-28 RX ORDER — NITROFURANTOIN 25; 75 MG/1; MG/1
100 CAPSULE ORAL 2 TIMES DAILY
Qty: 20 CAPSULE | Refills: 0 | Status: SHIPPED | OUTPATIENT
Start: 2024-06-28 | End: 2024-07-08

## 2024-06-28 NOTE — TELEPHONE ENCOUNTER
Two patient identifiers used        Patient sent this message this am    June 28, 2024  Payal Chan   to [facility] (supporting Mary Isaac MD)   SB      6/28/24 11:29 AM  Good morning,   I have been experiencing some weird feelings down there. My pee has a weird order and every time I pee I feel like it has a tingling feeling. There’s no burning but I feel like it’s my nerves down there. It makes my whole feel tingle. I really don’t know if I’m just over thinking it or if there’s something going on        23 year old patient last seen in the office on 2/26/2024 for ae and is scheduled for next year     Patient sent the above message and was advised to seek care or evaluation by her PCP    Patient verbalized understanding. And will check with her PCP or urgent care

## 2024-06-28 NOTE — TELEPHONE ENCOUNTER
Pt called requesting urgent appointment today for possible UTI.  No appointments available today and pt agrees to try [facility] for evaluation and treatment. Yuriy

## 2024-06-28 NOTE — PATIENT INSTRUCTIONS
Start antibiotic and finish medication. We will call you if we need to make any antibiotic changes based upon your culture results.  Increase fluids, especially water.  Take your temperature and seek care in the nearest emergency room if you develop a fever and chills.   Follow up with your PCP in 7 days.

## 2024-06-28 NOTE — PROGRESS NOTES
Payal Chan (:  2000) is a 23 y.o. female,New patient, here for evaluation of the following chief complaint(s):  Urinary Tract Infection (Smelly urine, some discomfort in lower abdomen and frequency x 4 days)          ASSESSMENT/PLAN:  Visit Diagnoses and Associated Orders       Urine abnormality    -  Primary    AMB POC URINALYSIS DIP STICK AUTO W/O MICRO [56677 CPT(R)]      Urine culture (clean catch) [13977 Custom]   - Future Order    Urine culture (clean catch) [18234 Custom]           Acute cystitis with hematuria        nitrofurantoin, macrocrystal-monohydrate, (MACROBID) 100 MG capsule [61541]                  Follow up in 7 days if symptoms persist or if symptoms worsen.    SUBJECTIVE/OBJECTIVE:    23 y.o. female presents with symptoms of   Urinary Tract Infection  Patient complains of burning with urination, hematuria, suprapubic pressure She has had symptoms for 5 days. Patient also complains of  current menstruation and menstruation cramps . It is hard for her to discern between urinary suprapubic pelvic pain versus menstrual cramps. Patient denies back pain and fever. Patient does not have a history of recurrent UTI.  Patient does not have a history of pyelonephritis.          Vitals:    24 1828   BP: 122/80   Site: Left Upper Arm   Position: Sitting   Cuff Size: Medium Adult   Pulse: 71   Temp: 98.3 °F (36.8 °C)   TempSrc: Oral   SpO2: 99%   Weight: 93.4 kg (205 lb 12.8 oz)       Results for orders placed or performed in visit on 24   AMB POC URINALYSIS DIP STICK AUTO W/O MICRO   Result Value Ref Range    Color, Urine, POC Red     Clarity, Urine, POC Slightly Cloudy     Glucose, Urine, POC Negative     Bilirubin, Urine, POC Negative     Ketones, Urine, POC Trace     Specific Gravity, Urine, POC 1.025 1.001 - 1.035    Blood, Urine, POC 3+     pH, Urine, POC 6 4.6 - 8.0    Protein, Urine, POC 2+     Urobilinogen, POC Normal     Nitrite, Urine, POC Negative     Leukocyte

## 2024-07-01 LAB — BACTERIA UR CULT: NORMAL

## 2024-07-22 ENCOUNTER — OFFICE VISIT (OUTPATIENT)
Age: 24
End: 2024-07-22

## 2024-07-22 VITALS
WEIGHT: 198 LBS | TEMPERATURE: 98.3 F | BODY MASS INDEX: 35.08 KG/M2 | DIASTOLIC BLOOD PRESSURE: 74 MMHG | HEART RATE: 74 BPM | HEIGHT: 63 IN | RESPIRATION RATE: 16 BRPM | OXYGEN SATURATION: 98 % | SYSTOLIC BLOOD PRESSURE: 110 MMHG

## 2024-07-22 DIAGNOSIS — F07.81 POST CONCUSSIVE SYNDROME: Primary | ICD-10-CM

## 2024-07-22 RX ORDER — BUTALBITAL, ACETAMINOPHEN AND CAFFEINE 50; 325; 40 MG/1; MG/1; MG/1
1 TABLET ORAL EVERY 6 HOURS PRN
Qty: 20 TABLET | Refills: 0 | Status: SHIPPED | OUTPATIENT
Start: 2024-07-22 | End: 2024-07-27

## 2024-07-22 ASSESSMENT — ENCOUNTER SYMPTOMS
EYE PAIN: 0
PHOTOPHOBIA: 1
EYE REDNESS: 0

## 2024-07-22 NOTE — PROGRESS NOTES
2024   Payal Chan (: 2000) is a 24 y.o. female, New patient, here for evaluation of the following chief complaint(s):  Concussion (Pt tripped and fell hitting head on 7/15. Pt seen  told to f/p with pcp but having bad migraines. )     ASSESSMENT/PLAN:  Below is the assessment and plan developed based on review of pertinent history, physical exam, labs, studies, and medications.  1. Post concussive syndrome    - Fioricet  - Screen rest  - OOW x 2 days - note given    Handout given with care instructions  2. OTC for symptom management. Increase fluid intake, ensure adequate nutritional intake.  3. Follow up with PCP as needed.  4. Go to ED with development of any acute symptoms.     Follow up:  Return if symptoms worsen or fail to improve.  Follow up immediately for any new, worsening or changes or if symptoms are not improving over the next 5-7 days.     SUBJECTIVE/OBJECTIVE:  Pt had a fall hitting her chin on 7/15/24 and was seen at [facility] ED on [place] on . She was diagnosed with a concussion and treated with Fioricet and Zofran and taken out of work x 2 days.  She continues with headaches and photophobia.  She has not really had nausea.  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           Diagnoses and all orders for this visit:  Post concussive syndrome      Concussion (Pt tripped and fell hitting head on 7/15. Pt seen  told to f/p with pcp but having bad migraines. )             Review of Systems   Constitutional:  Negative for chills, fatigue and fever.   Eyes:  Positive for photophobia. Negative for pain, redness and visual disturbance.   Neurological:  Positive for headaches. Negative for dizziness, speech difficulty, weakness and light-headedness.         Physical Exam  Constitutional:       Appearance: Normal appearance.   HENT:      Head: Normocephalic.      Right Ear: Tympanic membrane, ear canal and external ear

## 2024-08-02 ENCOUNTER — OFFICE VISIT (OUTPATIENT)
Age: 24
End: 2024-08-02

## 2024-08-02 VITALS
TEMPERATURE: 98.7 F | WEIGHT: 195 LBS | DIASTOLIC BLOOD PRESSURE: 82 MMHG | OXYGEN SATURATION: 97 % | BODY MASS INDEX: 33.29 KG/M2 | HEIGHT: 64 IN | HEART RATE: 84 BPM | RESPIRATION RATE: 18 BRPM | SYSTOLIC BLOOD PRESSURE: 126 MMHG

## 2024-08-02 DIAGNOSIS — Z87.440 H/O URINARY TRACT INFECTION: Primary | ICD-10-CM

## 2024-08-02 DIAGNOSIS — R35.0 FREQUENCY OF URINATION: ICD-10-CM

## 2024-08-02 LAB
BILIRUBIN, URINE, POC: NEGATIVE
BLOOD URINE, POC: ABNORMAL
GLUCOSE URINE, POC: NEGATIVE
KETONES, URINE, POC: NEGATIVE
LEUKOCYTE ESTERASE, URINE, POC: NEGATIVE
NITRITE, URINE, POC: NEGATIVE
PH, URINE, POC: 6.5 (ref 4.6–8)
PROTEIN,URINE, POC: NEGATIVE
SPECIFIC GRAVITY, URINE, POC: 1.03 (ref 1–1.03)
URINALYSIS CLARITY, POC: ABNORMAL
URINALYSIS COLOR, POC: ABNORMAL
UROBILINOGEN, POC: ABNORMAL

## 2024-08-02 NOTE — PROGRESS NOTES
2024   Payal Chan (: 2000) is a 24 y.o. female, New patient, here for evaluation of the following chief complaint(s):  Urinary Tract Infection (Was seen here for uti in , [facility] - finished Rx antibiotic today and just wants to make sure it's gone.  Feels fine today)     ASSESSMENT/PLAN:  Below is the assessment and plan developed based on review of pertinent history, physical exam, labs, studies, and medications.  1. H/O urinary tract infection  2. Frequency of urination  -     AMB POC URINALYSIS DIP STICK AUTO W/O MICRO         Handout given with care instructions  2. OTC for symptom management. Increase fluid intake, ensure adequate nutritional intake.  3. Follow up with PCP as needed.  4. Go to ED with development of any acute symptoms.     Follow up:    Follow up immediately for any new, worsening or changes or if symptoms are not improving over the next 5-7 days.     SUBJECTIVE/OBJECTIVE:  Just completed a prescription for UTI from [facility] - nitrofurantoin and feels well.  Wants to confirm UTI is resolved.  She did start her mestrual peroid today.      Urinary Tract Infection             Urinary Tract Infection (Was seen here for uti in , [facility] - finished Rx antibiotic today and just wants to make sure it's gone.  Feels fine today)      Results for orders placed or performed in visit on 24   AMB POC URINALYSIS DIP STICK AUTO W/O MICRO   Result Value Ref Range    Color, Urine, POC Dark Yellow     Clarity, Urine, POC Cloudy     Glucose, Urine, POC Negative     Bilirubin, Urine, POC Negative     Ketones, Urine, POC Negative     Specific Gravity, Urine, POC 1.030 1.001 - 1.035    Blood, Urine, POC Large     pH, Urine, POC 6.5 4.6 - 8.0    Protein, Urine, POC Negative     Urobilinogen, POC 0.2 mg/dL     Nitrite, Urine, POC Negative     Leukocyte Esterase, Urine, POC Negative          XR

## 2024-08-15 RX ORDER — ESCITALOPRAM OXALATE 20 MG/1
20 TABLET ORAL DAILY
Qty: 90 TABLET | Refills: 0 | Status: SHIPPED | OUTPATIENT
Start: 2024-08-15

## 2024-08-15 NOTE — TELEPHONE ENCOUNTER
ANGELAM for pt to call back and schedule a follow up appointment as soon as possible since it has been almost a year since we have seen her.

## 2024-10-24 ENCOUNTER — OFFICE VISIT (OUTPATIENT)
Facility: CLINIC | Age: 24
End: 2024-10-24

## 2024-10-24 VITALS
TEMPERATURE: 97.6 F | SYSTOLIC BLOOD PRESSURE: 119 MMHG | OXYGEN SATURATION: 98 % | DIASTOLIC BLOOD PRESSURE: 78 MMHG | HEART RATE: 62 BPM | RESPIRATION RATE: 16 BRPM | HEIGHT: 63 IN | BODY MASS INDEX: 39.57 KG/M2 | WEIGHT: 223.3 LBS

## 2024-10-24 DIAGNOSIS — F33.40 RECURRENT MAJOR DEPRESSIVE DISORDER, IN REMISSION (HCC): ICD-10-CM

## 2024-10-24 DIAGNOSIS — F41.9 ANXIETY: Primary | ICD-10-CM

## 2024-10-24 DIAGNOSIS — F51.04 PSYCHOPHYSIOLOGICAL INSOMNIA: ICD-10-CM

## 2024-10-24 DIAGNOSIS — Z23 NEEDS FLU SHOT: ICD-10-CM

## 2024-10-24 RX ORDER — SPIRONOLACTONE 100 MG/1
100 TABLET, FILM COATED ORAL DAILY
COMMUNITY
Start: 2024-10-07

## 2024-10-24 RX ORDER — TRAZODONE HYDROCHLORIDE 50 MG/1
50 TABLET, FILM COATED ORAL DAILY PRN
Qty: 90 TABLET | Refills: 1 | Status: SHIPPED | OUTPATIENT
Start: 2024-10-24

## 2024-10-24 RX ORDER — ESCITALOPRAM OXALATE 20 MG/1
20 TABLET ORAL DAILY
Qty: 90 TABLET | Refills: 1 | Status: SHIPPED | OUTPATIENT
Start: 2024-10-24

## 2024-10-24 SDOH — ECONOMIC STABILITY: FOOD INSECURITY: WITHIN THE PAST 12 MONTHS, YOU WORRIED THAT YOUR FOOD WOULD RUN OUT BEFORE YOU GOT MONEY TO BUY MORE.: NEVER TRUE

## 2024-10-24 SDOH — ECONOMIC STABILITY: FOOD INSECURITY: WITHIN THE PAST 12 MONTHS, THE FOOD YOU BOUGHT JUST DIDN'T LAST AND YOU DIDN'T HAVE MONEY TO GET MORE.: NEVER TRUE

## 2024-10-24 SDOH — ECONOMIC STABILITY: INCOME INSECURITY: HOW HARD IS IT FOR YOU TO PAY FOR THE VERY BASICS LIKE FOOD, HOUSING, MEDICAL CARE, AND HEATING?: NOT HARD AT ALL

## 2024-10-24 ASSESSMENT — PATIENT HEALTH QUESTIONNAIRE - PHQ9
7. TROUBLE CONCENTRATING ON THINGS, SUCH AS READING THE NEWSPAPER OR WATCHING TELEVISION: MORE THAN HALF THE DAYS
SUM OF ALL RESPONSES TO PHQ9 QUESTIONS 1 & 2: 2
SUM OF ALL RESPONSES TO PHQ QUESTIONS 1-9: 12
6. FEELING BAD ABOUT YOURSELF - OR THAT YOU ARE A FAILURE OR HAVE LET YOURSELF OR YOUR FAMILY DOWN: SEVERAL DAYS
4. FEELING TIRED OR HAVING LITTLE ENERGY: MORE THAN HALF THE DAYS
5. POOR APPETITE OR OVEREATING: MORE THAN HALF THE DAYS
2. FEELING DOWN, DEPRESSED OR HOPELESS: SEVERAL DAYS
1. LITTLE INTEREST OR PLEASURE IN DOING THINGS: SEVERAL DAYS
8. MOVING OR SPEAKING SO SLOWLY THAT OTHER PEOPLE COULD HAVE NOTICED. OR THE OPPOSITE, BEING SO FIGETY OR RESTLESS THAT YOU HAVE BEEN MOVING AROUND A LOT MORE THAN USUAL: MORE THAN HALF THE DAYS
SUM OF ALL RESPONSES TO PHQ QUESTIONS 1-9: 12
SUM OF ALL RESPONSES TO PHQ QUESTIONS 1-9: 12
10. IF YOU CHECKED OFF ANY PROBLEMS, HOW DIFFICULT HAVE THESE PROBLEMS MADE IT FOR YOU TO DO YOUR WORK, TAKE CARE OF THINGS AT HOME, OR GET ALONG WITH OTHER PEOPLE: SOMEWHAT DIFFICULT
SUM OF ALL RESPONSES TO PHQ QUESTIONS 1-9: 12
9. THOUGHTS THAT YOU WOULD BE BETTER OFF DEAD, OR OF HURTING YOURSELF: NOT AT ALL
3. TROUBLE FALLING OR STAYING ASLEEP: SEVERAL DAYS

## 2024-10-24 ASSESSMENT — COLUMBIA-SUICIDE SEVERITY RATING SCALE - C-SSRS
6. HAVE YOU EVER DONE ANYTHING, STARTED TO DO ANYTHING, OR PREPARED TO DO ANYTHING TO END YOUR LIFE?: NO
1. WITHIN THE PAST MONTH, HAVE YOU WISHED YOU WERE DEAD OR WISHED YOU COULD GO TO SLEEP AND NOT WAKE UP?: NO
2. HAVE YOU ACTUALLY HAD ANY THOUGHTS OF KILLING YOURSELF?: NO

## 2024-10-24 NOTE — ASSESSMENT & PLAN NOTE
Will refill medication now, reports this is working well for her.  Encourage patient to restart at half dose and increase as tolerated.  Reviewed labs performed by [facility] with stable findings.    Orders:    escitalopram (LEXAPRO) 20 MG tablet; Take 1 tablet by mouth daily

## 2024-10-24 NOTE — ASSESSMENT & PLAN NOTE
Reports longstanding problems staying asleep.  Has failed other treatment measures.  Would like to restart trazodone at this time.  Reviewed safety with medication.    Orders:    traZODone (DESYREL) 50 MG tablet; Take 1 tablet by mouth daily as needed for Sleep

## 2024-10-24 NOTE — PATIENT INSTRUCTIONS
Today we discussed:  We refilled your meds today.  Start the lexapro with 1/2 tab and then increase after a few days to let your body get used to it again.  We did your flu shot today.    Thank you and great to see you today!   Please reach out on [facility] with any questions.   Anh Lewis, KENYETTA - CNP

## 2024-10-24 NOTE — ASSESSMENT & PLAN NOTE
Stable, restarting Lexapro.  Denies SI/HI.    Orders:    escitalopram (LEXAPRO) 20 MG tablet; Take 1 tablet by mouth daily

## 2024-10-24 NOTE — PROGRESS NOTES
Family Medicine Follow-Up Progress Note   [facility]    Patient Payal Chan  2000, 24 y.o., female  Encounter Date 10/24/24    ASSESSMENT AND PLAN:     Assessment & Plan      Assessment & Plan  Anxiety    Will refill medication now, reports this is working well for her.  Encourage patient to restart at half dose and increase as tolerated.  Reviewed labs performed by [facility] endocrinology with stable findings.    Orders:    escitalopram (LEXAPRO) 20 MG tablet; Take 1 tablet by mouth daily    Recurrent major depressive disorder, in remission (HCC)    Stable, restarting Lexapro.  Denies SI/HI.    Orders:    escitalopram (LEXAPRO) 20 MG tablet; Take 1 tablet by mouth daily    Needs flu shot    Amenable to flu vaccine today.    Orders:    Influenza, FLUCELVAX Trivalent, (age 6 mo+) IM, Preservative Free, 0.5mL    Psychophysiological insomnia    Reports longstanding problems staying asleep.  Has failed other treatment measures.  Would like to restart trazodone at this time.  Reviewed safety with medication.    Orders:    traZODone (DESYREL) 50 MG tablet; Take 1 tablet by mouth daily as needed for Sleep      Orders Placed This Encounter    Influenza, FLUCELVAX Trivalent, (age 6 mo+) IM, Preservative Free, 0.5mL    spironolactone (ALDACTONE) 100 MG tablet     Sig: Take 1 tablet by mouth daily    escitalopram (LEXAPRO) 20 MG tablet     Sig: Take 1 tablet by mouth daily     Dispense:  90 tablet     Refill:  1    traZODone (DESYREL) 50 MG tablet     Sig: Take 1 tablet by mouth daily as needed for Sleep     Dispense:  90 tablet     Refill:  1        Follow-up and Dispositions    Return in about 6 months (around 4/24/2025) for Depression/Anxiety.          Return in about 6 months (around 4/24/2025) for Depression/Anxiety.    Patient Instructions   Today we discussed:  We refilled your meds today.  Start the lexapro with 1/2 tab and then increase after a few days to let your body get used to it

## 2024-10-24 NOTE — PROGRESS NOTES
Chief Complaint   Patient presents with    Anxiety     Payal Chan is a 24 y.o. female who presents for a follow up on anxiety.      \"Have you been to the ER, urgent care clinic since your last visit?  Hospitalized since your last visit?\"    NO    “Have you seen or consulted any other health care providers outside of [facility] since your last visit?”    NO            Click Here for Release of Records Request

## 2024-11-08 ENCOUNTER — OFFICE VISIT (OUTPATIENT)
Age: 24
End: 2024-11-08

## 2024-11-08 ENCOUNTER — TELEPHONE (OUTPATIENT)
Age: 24
End: 2024-11-08

## 2024-11-08 VITALS
BODY MASS INDEX: 39.15 KG/M2 | WEIGHT: 221 LBS | DIASTOLIC BLOOD PRESSURE: 85 MMHG | SYSTOLIC BLOOD PRESSURE: 127 MMHG | HEART RATE: 121 BPM

## 2024-11-08 DIAGNOSIS — N91.2 AMENORRHEA: ICD-10-CM

## 2024-11-08 DIAGNOSIS — N91.2 AMENORRHEA: Primary | ICD-10-CM

## 2024-11-08 DIAGNOSIS — E28.2 PCOS (POLYCYSTIC OVARIAN SYNDROME): ICD-10-CM

## 2024-11-08 LAB
HCG, PREGNANCY, URINE, POC: NEGATIVE
VALID INTERNAL CONTROL, POC: YES

## 2024-11-08 RX ORDER — DROSPIRENONE AND ETHINYL ESTRADIOL 0.02-3(28)
1 KIT ORAL DAILY
Qty: 1 PACKET | Refills: 3 | Status: SHIPPED | OUTPATIENT
Start: 2024-11-08

## 2024-11-08 RX ORDER — MEDROXYPROGESTERONE ACETATE 10 MG
10 TABLET ORAL DAILY
Qty: 10 TABLET | Refills: 0 | Status: SHIPPED | OUTPATIENT
Start: 2024-11-08

## 2024-11-08 NOTE — PROGRESS NOTES
Payal Chan is a 24 y.o. female presents for a problem visit.    Chief Complaint   Patient presents with    Amenorrhea     Patient's last menstrual period was 09/27/2024 (approximate).  Birth Control: none.  Last Pap: normal obtained 2/26/24.    The patient is reporting having: Amenorrhea for a little over a month.  She has done 3 preg tests at home and all were negative.

## 2024-11-08 NOTE — PROGRESS NOTES
Per Nursing Note:     Payal Chan is a 24 y.o. female presents for a problem visit.         Chief Complaint   Patient presents with    Amenorrhea      Patient's last menstrual period was 2024 (approximate).  Birth Control: none.  Last Pap: normal obtained 24.     The patient is reporting having: Amenorrhea for a little over a month.  She has done 3 preg tests at home and all were negative.              OB/GYN Problem Visit        Chief Complaint   Patient presents with    Amenorrhea       HPI  Payal Chan is a ,  24 y.o. female who presents for a problem visit.   Patient's last menstrual period was 2024 (approximate).    Saw [facility] 10/7/24.  Notes/labs reviewed in [facility]  Incr metformin from 500mg qd to BID.  Goal is 1000mg BID  Spironolactone incr from 50mg to 100mg daily  TSH (10/7/24)=1.4  K=4.2  WX=333/81    BP (10/24/24 - in Epic, with PCP)=199/78    Periods usually q 28-30d. Occ a couple of days late.    This is the first time period has been this late.    No galactorrhea            Past Medical History:   Diagnosis Date    ADD (attention deficit disorder)     Anxiety     Chlamydia 2024    Depression with anxiety     HPV vaccine counseling     completed series    PCOS (polycystic ovarian syndrome) 2023    Routine Papanicolaou smear 2024    normal     History reviewed. No pertinent surgical history.  OB History    Para Term  AB Living   0 0 0 0 0 0   SAB IAB Ectopic Molar Multiple Live Births   0 0 0 0 0 0       Social History     Occupational History    Not on file   Tobacco Use    Smoking status: Never    Smokeless tobacco: Former     Quit date: 2019    Tobacco comments:     Quit smoking: vaping   Vaping Use    Vaping status: Never Used   Substance and Sexual Activity    Alcohol use: Never    Drug use: Yes     Types: Marijuana (Weed)    Sexual activity: Yes     Partners: Male     Birth control/protection: None     Family History   Problem

## 2024-11-08 NOTE — TELEPHONE ENCOUNTER
Pt reporting that she is 2 weeks late on her cycle. She reports a history of PCOS and is normal 3 -4 days late but this is the longest she has gone without her cycle.  Patient reports taking three pregnancy tests (last one 3 days ago) and they all were negative.  LMP was at the end of September/beginning of October.    MD advised for next steps

## 2024-11-09 LAB — PROLACTIN SERPL-MCNC: 20.6 NG/ML

## 2024-11-18 ENCOUNTER — PATIENT MESSAGE (OUTPATIENT)
Facility: CLINIC | Age: 24
End: 2024-11-18

## 2024-11-20 ENCOUNTER — NURSE ONLY (OUTPATIENT)
Facility: CLINIC | Age: 24
End: 2024-11-20

## 2024-11-20 DIAGNOSIS — Z11.1 TUBERCULOSIS SCREENING: Primary | ICD-10-CM

## 2024-11-20 NOTE — PROGRESS NOTES
Chief Complaint   Patient presents with    Tuberculosis test     Payal Chan is a 24 y.o. female who presents for a PPD test.    Placement was done on right forearm. Left arm could not be used due to Tattoo.     Patient tolerated procedure well.     Fady

## 2024-11-22 ENCOUNTER — NURSE ONLY (OUTPATIENT)
Facility: CLINIC | Age: 24
End: 2024-11-22

## 2024-11-22 DIAGNOSIS — Z11.1 TUBERCULOSIS SCREENING: Primary | ICD-10-CM

## 2024-11-22 LAB
INDURATION: 0
TB SKIN TEST: NORMAL

## 2024-11-22 NOTE — PROGRESS NOTES
Chief Complaint   Patient presents with    PPD Reading     Patient presents for a PPD reading. Placement as done on 11/20/2024 at 2:13PM. Reading was normal with \"0\" mm indentation and no skin changes.

## 2024-12-05 ENCOUNTER — OFFICE VISIT (OUTPATIENT)
Age: 24
End: 2024-12-05

## 2024-12-05 VITALS
OXYGEN SATURATION: 96 % | BODY MASS INDEX: 39.5 KG/M2 | TEMPERATURE: 98.1 F | HEART RATE: 107 BPM | WEIGHT: 223 LBS | DIASTOLIC BLOOD PRESSURE: 85 MMHG | SYSTOLIC BLOOD PRESSURE: 132 MMHG

## 2024-12-05 DIAGNOSIS — N30.00 ACUTE CYSTITIS WITHOUT HEMATURIA: ICD-10-CM

## 2024-12-05 DIAGNOSIS — R30.0 DYSURIA: Primary | ICD-10-CM

## 2024-12-05 LAB
BILIRUBIN, URINE, POC: NEGATIVE
BLOOD URINE, POC: NEGATIVE
GLUCOSE URINE, POC: NEGATIVE
KETONES, URINE, POC: NEGATIVE
LEUKOCYTE ESTERASE, URINE, POC: NORMAL
NITRITE, URINE, POC: NEGATIVE
PH, URINE, POC: 8 (ref 4.6–8)
PROTEIN,URINE, POC: NORMAL
SPECIFIC GRAVITY, URINE, POC: 1.02 (ref 1–1.03)
URINALYSIS CLARITY, POC: CLEAR
URINALYSIS COLOR, POC: YELLOW
UROBILINOGEN, POC: NORMAL

## 2024-12-05 RX ORDER — NITROFURANTOIN 25; 75 MG/1; MG/1
100 CAPSULE ORAL 2 TIMES DAILY
Qty: 10 CAPSULE | Refills: 0 | Status: SHIPPED | OUTPATIENT
Start: 2024-12-05 | End: 2024-12-10

## 2024-12-05 NOTE — PROGRESS NOTES
visit:  Dysuria  -     AMB POC URINALYSIS DIP STICK AUTO W/O MICRO  Acute cystitis without hematuria      Other (Pt present with foul smelling urine. No other symptoms. )      Results for orders placed or performed in visit on 12/05/24   AMB POC URINALYSIS DIP STICK AUTO W/O MICRO   Result Value Ref Range    Color, Urine, POC Yellow     Clarity, Urine, POC Clear     Glucose, Urine, POC Negative     Bilirubin, Urine, POC Negative     Ketones, Urine, POC Negative     Specific Gravity, Urine, POC 1.020 1.001 - 1.035    Blood, Urine, POC Negative Negative    pH, Urine, POC 8.0 4.6 - 8.0    Protein, Urine, POC Trace     Urobilinogen, POC 1 mg/dL     Nitrite, Urine, POC Negative     Leukocyte Esterase, Urine, POC Small          XR Results (most recent):  @[facility]([id number])@         Physical Exam  Constitutional:       Appearance: Normal appearance.   HENT:      Head: Normocephalic and atraumatic.      Right Ear: Tympanic membrane, ear canal and external ear normal.      Left Ear: Tympanic membrane, ear canal and external ear normal.      Nose: Nose normal.      Mouth/Throat:      Mouth: Mucous membranes are moist.   Eyes:      Extraocular Movements: Extraocular movements intact.      Conjunctiva/sclera: Conjunctivae normal.      Pupils: Pupils are equal, round, and reactive to light.   Cardiovascular:      Rate and Rhythm: Normal rate and regular rhythm.      Pulses: Normal pulses.      Heart sounds: Normal heart sounds.   Pulmonary:      Effort: Pulmonary effort is normal.      Breath sounds: Normal breath sounds.   Abdominal:      Palpations: Abdomen is soft.      Tenderness: There is no abdominal tenderness. There is no right CVA tenderness or left CVA tenderness.   Musculoskeletal:         General: Normal range of motion.      Cervical back: Normal range of motion.   Skin:     General: Skin is warm.   Neurological:      General: No focal deficit present.      Mental Status: She is alert and oriented to

## 2024-12-05 NOTE — PATIENT INSTRUCTIONS
You have been evaluated at Urgent Care today for your urinary symptoms. Your evaluation, including urinalysis, suggests that your symptoms are due to a urinary tract infection. Please take your prescribed antibiotics for the full course of the medication as directed.    Please follow up with your primary care physician within two days.    Return seek care in the Emergency Department if you experience fevers 100.4° or greater, worsening or uncontrolled pain, vomiting, flank pain, or for any other concerning symptoms.    Thank you for choosing us for your care.

## 2024-12-16 ENCOUNTER — OFFICE VISIT (OUTPATIENT)
Age: 24
End: 2024-12-16

## 2024-12-16 VITALS
OXYGEN SATURATION: 98 % | WEIGHT: 220 LBS | TEMPERATURE: 99 F | HEIGHT: 64 IN | SYSTOLIC BLOOD PRESSURE: 126 MMHG | HEART RATE: 109 BPM | DIASTOLIC BLOOD PRESSURE: 87 MMHG | BODY MASS INDEX: 37.56 KG/M2 | RESPIRATION RATE: 18 BRPM

## 2024-12-16 DIAGNOSIS — J06.9 VIRAL UPPER RESPIRATORY TRACT INFECTION: Primary | ICD-10-CM

## 2024-12-16 RX ORDER — DEXTROMETHORPHAN HYDROBROMIDE AND PROMETHAZINE HYDROCHLORIDE 15; 6.25 MG/5ML; MG/5ML
5 SYRUP ORAL 4 TIMES DAILY PRN
Qty: 100 ML | Refills: 0 | Status: SHIPPED | OUTPATIENT
Start: 2024-12-16 | End: 2024-12-21

## 2024-12-16 ASSESSMENT — ENCOUNTER SYMPTOMS
SORE THROAT: 1
CHEST TIGHTNESS: 1
COUGH: 1

## 2024-12-16 NOTE — PROGRESS NOTES
2024   Payal Chan (: 2000) is a 24 y.o. female, New patient, here for evaluation of the following chief complaint(s):  Pharyngitis, Cough (Pt c/o a cough for about 2 days ), and Chest Pain (Tightening of the chest )     ASSESSMENT/PLAN:  Below is the assessment and plan developed based on review of pertinent history, physical exam, labs, studies, and medications.  1. Viral upper respiratory tract infection    - promethazine- DM     Handout given with care instructions  2. OTC for symptom management. Increase fluid intake, ensure adequate nutritional intake.  3. Follow up with PCP as needed.  4. Go to ED with development of any acute symptoms.     Follow up:  Return if symptoms worsen or fail to improve.  Follow up immediately for any new, worsening or changes or if symptoms are not improving over the next 5-7 days.     SUBJECTIVE/OBJECTIVE:    Pharyngitis  Associated symptoms: chest pain, congestion, cough, fatigue and sore throat    Associated symptoms: no fever    Cough  Associated symptoms include chest pain and a sore throat. Pertinent negatives include no fever.   Chest Pain   Associated symptoms include a cough. Pertinent negatives include no fever.        Pharyngitis, Cough (Pt c/o a cough for about 2 days ), and Chest Pain (Tightening of the chest )      Review of Systems   Constitutional:  Positive for fatigue. Negative for fever.   HENT:  Positive for congestion and sore throat.    Respiratory:  Positive for cough and chest tightness.    Cardiovascular:  Positive for chest pain.         Physical Exam  Constitutional:       Appearance: Normal appearance.   HENT:      Head: Normocephalic.      Right Ear: Ear canal and external ear normal. No middle ear effusion.      Left Ear: Ear canal and external ear normal.  No middle ear effusion.      Nose: Mucosal edema and congestion present. No rhinorrhea.      Right Turbinates: Not enlarged.      Left Turbinates: Not enlarged.      Right Sinus:

## 2024-12-16 NOTE — PATIENT INSTRUCTIONS
Please follow up with your primary care provider within 2-5 days if your signs and symptoms have not resolved or worsened.     Please go immediately to the Emergency Department if you develop shortness of breath, chest pain and uncontrollable fever above 100.4F.     Nasal Congestion:  Flonase (over the counter) nasal spray, once a day  Saline irrigation kits help wash out sinuses 1-2 times a day  Normal saline nasal spray     Cough:  Throat lozenges, hot tea, and honey may help  Vicks VapoRub at night to help with cough and relieve muscles aches and pain  If not prescribed a cough medication, Delsym is an option.  It is an over the counter cough medication containing dextromethorphan to help suppress cough at night              If you have high blood pressure, take Coricidin HBP (or the generic form) instead.  Follow instructions on the box.     Congestion:  For thick mucus, take Mucinex (with Guafenesin only) to help thin the mucus.  Follow instructions on the box.  You will need to drink plenty of water with this medication.     Sore Throat:  Lozenges, as needed. Cepacol lozenges will help numb the throat  Chloraseptic spray also helps to numb throat pain  Salt water gargles to soothe throat pain     Sinus pain/pressure:  Warm, wet towel on face to help with facial sinus pain/pressure  Neti Pot or Saline Rinse can be helpful      Headache/Pain Fever/Body Aches:  If you can take NSAIDs, take Ibuprofen 400-800mg every 8 hours as needed  If you cannot take NSAIDs, take Tylenol 325-500mg every 6 hours as needed     Miscellanous:  Zyrtec/Xyzal/Allegra/Claritin during the day or Benadryl at night may help with allergies  Simple foods like chicken noodle soup, smoothies, hot tea with lemon and honey may also help

## 2024-12-17 ENCOUNTER — OFFICE VISIT (OUTPATIENT)
Facility: CLINIC | Age: 24
End: 2024-12-17
Payer: MEDICAID

## 2024-12-17 VITALS
HEIGHT: 63 IN | OXYGEN SATURATION: 98 % | HEART RATE: 120 BPM | DIASTOLIC BLOOD PRESSURE: 78 MMHG | SYSTOLIC BLOOD PRESSURE: 122 MMHG | RESPIRATION RATE: 16 BRPM | WEIGHT: 220.2 LBS | BODY MASS INDEX: 39.02 KG/M2 | TEMPERATURE: 101.7 F

## 2024-12-17 DIAGNOSIS — J10.1 INFLUENZA A: ICD-10-CM

## 2024-12-17 DIAGNOSIS — R00.0 TACHYCARDIA: ICD-10-CM

## 2024-12-17 DIAGNOSIS — U07.1 COVID-19: Primary | ICD-10-CM

## 2024-12-17 DIAGNOSIS — B34.9 VIRAL SYNDROME: ICD-10-CM

## 2024-12-17 LAB
EXP DATE SOLUTION: ABNORMAL
EXP DATE SWAB: ABNORMAL
EXPIRATION DATE: ABNORMAL
GROUP A STREP ANTIGEN, POC: NEGATIVE
LOT NUMBER POC: ABNORMAL
LOT NUMBER SOLUTION: ABNORMAL
LOT NUMBER SWAB: ABNORMAL
QUICKVUE INFLUENZA TEST: POSITIVE
SARS-COV-2 RNA, POC: NEGATIVE
VALID INTERNAL CONTROL, POC: YES
VALID INTERNAL CONTROL, POC: YES

## 2024-12-17 PROCEDURE — 87635 SARS-COV-2 COVID-19 AMP PRB: CPT | Performed by: FAMILY MEDICINE

## 2024-12-17 PROCEDURE — 87804 INFLUENZA ASSAY W/OPTIC: CPT | Performed by: FAMILY MEDICINE

## 2024-12-17 PROCEDURE — 87880 STREP A ASSAY W/OPTIC: CPT | Performed by: FAMILY MEDICINE

## 2024-12-17 PROCEDURE — 99213 OFFICE O/P EST LOW 20 MIN: CPT | Performed by: FAMILY MEDICINE

## 2024-12-17 RX ORDER — BENZONATATE 200 MG/1
200 CAPSULE ORAL 3 TIMES DAILY PRN
Qty: 21 CAPSULE | Refills: 0 | Status: SHIPPED | OUTPATIENT
Start: 2024-12-17 | End: 2024-12-24

## 2024-12-17 NOTE — PROGRESS NOTES
Chief Complaint   Patient presents with    Cold Symptoms     Payal Chan is a 24 y.o. female who presents for a follow up on URI. Patient was seen at  for this concern yesterday. She was given cough syrup, however, her symptoms have progressively worsened today.      \"Have you been to the ER, urgent care clinic since your last visit?  Hospitalized since your last visit?\"    YES - When: approximately 1 days ago.  Where and Why: , note in chart.    “Have you seen or consulted any other health care providers outside of [facility] since your last visit?”    NO            Click Here for Release of Records Request  
have discussed the diagnosis with the patient and the intended plan as seen in the above orders.  The patient understands and agrees with the plan. The patient has received an after-visit summary and questions were answered concerning future plans.     Medication Side Effects and Warnings were discussed with patient  Patient Labs were reviewed and or requested:  Patient Past Records were reviewed and or requested    Please note that this dictation was completed with [email or web address], the computer voice recognition software.  Quite often unanticipated grammatical, syntax, homophones, and other interpretive errors are inadvertently transcribed by the computer software.  Please disregard these errors.  Please excuse any errors that have escaped final proofreading.  Thank you.     The patient (or guardian, if applicable) and other individuals in attendance with the patient were advised that Artificial Intelligence will be utilized during this visit to record and process the conversation to generate a clinical note. The patient (or guardian, if applicable) and other individuals in attendance at the appointment consented to the use of AI, including the recording.      KENYETTA Phan - CNP  [facility]  [phone]

## 2024-12-17 NOTE — PATIENT INSTRUCTIONS
Nasal Congestion:  Flonase (over the counter) nasal spray, once a day  Saline irrigation kits help wash out sinuses 1-2 times a day  Normal saline nasal spray     Cough:  Throat lozenges, hot tea, and honey may help  Vicks VapoRub at night to help with cough and relieve muscles aches and pain  If not prescribed a cough medication, Delsym is an option.  It is an over the counter cough medication containing dextromethorphan to help suppress cough at night              If you have high blood pressure, take Coricidin HBP (or the generic form) instead.  Follow instructions on the box.     Congestion:  For thick mucus, take Mucinex (with Guafenesin only) to help thin the mucus.  Follow instructions on the box.  You will need to drink plenty of water with this medication.     Sore Throat:  Lozenges, as needed. Cepacol lozenges will help numb the throat  Chloraseptic spray also helps to numb throat pain  Salt water gargles to soothe throat pain     Sinus pain/pressure:  Warm, wet towel on face to help with facial sinus pain/pressure  Neti Pot or Saline Rinse can be helpful      Headache/Pain Fever/Body Aches:  If you can take NSAIDs, take Ibuprofen 400-800mg every 8 hours as needed  If you cannot take NSAIDs, take Tylenol 325-500mg every 6 hours as needed     Miscellanous:  Zyrtec/Xyzal/Allegra/Claritin during the day or Benadryl at night may help with allergies  Simple foods like chicken noodle soup, smoothies, hot tea with lemon and honey may also help

## 2025-02-02 ENCOUNTER — PATIENT MESSAGE (OUTPATIENT)
Facility: CLINIC | Age: 25
End: 2025-02-02

## 2025-02-10 NOTE — TELEPHONE ENCOUNTER
Vyvanse should still last long enough for her night classes. If it doesn't, then we can consider medication changes.   She should try to take the medication consistently at the same time every day, but agree it's okay to take it at 10am if her normal time is 9am. 300

## 2025-02-26 NOTE — PROGRESS NOTES
Payal Chan is a 24 y.o. female returns for an annual exam     Chief Complaint   Patient presents with    Annual Exam       Patient's last menstrual period was 02/03/2025 (approximate).  Her periods are heavy in flow and often irregular with no apparent pattern.   She does not have dysmenorrhea.  Problems: no problems  Birth Control: condom.  Last Pap: normal obtained 1 year(s) ago.  She does not have a history of ANDRESSA 2, 3 or cervical cancer.   With regard to the Gardisil vaccine, she has received all 3 injections      1. Have you been to the ER, urgent care clinic, or hospitalized since your last visit? No    2. Have you seen or consulted any other health care providers outside of the [facility] since your last visit? No    Examination chaperoned by Ana Cristina Hui MA.

## 2025-02-27 ENCOUNTER — OFFICE VISIT (OUTPATIENT)
Age: 25
End: 2025-02-27
Payer: MEDICAID

## 2025-02-27 VITALS
HEIGHT: 63 IN | DIASTOLIC BLOOD PRESSURE: 81 MMHG | HEART RATE: 102 BPM | BODY MASS INDEX: 39.34 KG/M2 | SYSTOLIC BLOOD PRESSURE: 126 MMHG | WEIGHT: 222 LBS

## 2025-02-27 DIAGNOSIS — Z79.899 MEDICATION MANAGEMENT: Primary | ICD-10-CM

## 2025-02-27 PROCEDURE — 99395 PREV VISIT EST AGE 18-39: CPT | Performed by: STUDENT IN AN ORGANIZED HEALTH CARE EDUCATION/TRAINING PROGRAM

## 2025-02-27 SDOH — ECONOMIC STABILITY: FOOD INSECURITY: WITHIN THE PAST 12 MONTHS, YOU WORRIED THAT YOUR FOOD WOULD RUN OUT BEFORE YOU GOT MONEY TO BUY MORE.: NEVER TRUE

## 2025-02-27 SDOH — ECONOMIC STABILITY: FOOD INSECURITY: WITHIN THE PAST 12 MONTHS, THE FOOD YOU BOUGHT JUST DIDN'T LAST AND YOU DIDN'T HAVE MONEY TO GET MORE.: NEVER TRUE

## 2025-02-27 ASSESSMENT — PATIENT HEALTH QUESTIONNAIRE - PHQ9: DEPRESSION UNABLE TO ASSESS: PT REFUSES

## 2025-02-27 NOTE — PROGRESS NOTES
Annual exam ages 18-39    Payal Chan is a ,  24 y.o. female   Patient's last menstrual period was 2025 (approximate).    She presents for her annual checkup.     She is having problems -   H/o PCOS. Currently managing with metformin and aldactone. Was on cOCPs but had a hard time remembering to take daily. Interested in LARC.    With regard to the Gardasil vaccine, series completed.      Menstrual status:    Her periods are light, moderate in flow. She is using three to five pads or tampons per day. Cycles used to be regular/monthly prior to last , but since have been more irregular. Did provera withdrawal with Dr. Isaac, which brought cycles in January and February.     She does not have dysmenorrhea.    She reports no premenstrual symptoms.    Contraception:    The current method of family planning is condoms.    Sexual history:    She  reports being sexually active and has had partner(s) who are male. She reports using the following methods of birth control/protection: None and Condom.    Medical conditions:    Since her last annual GYN exam about 1 year ago, she has not the following changes in her health history: none.     Surgical history confirmed with patient.  has no past surgical history on file.    Pap and Mammogram History:    Her most recent Pap smear was was normal, obtained 1 year(s) ago.    The patient has never had a mammogram.    Breast Cancer History/Substance Abuse: negative    Past Medical History:   Diagnosis Date    ADD (attention deficit disorder)     Anxiety     Chlamydia 2024    Depression with anxiety     HPV vaccine counseling     completed series    PCOS (polycystic ovarian syndrome) 2023    Routine Papanicolaou smear 2024    normal     History reviewed. No pertinent surgical history.    Current Outpatient Medications   Medication Sig Dispense Refill    medroxyPROGESTERone (PROVERA) 10 MG tablet Take 1 tablet by mouth daily 10 tablet 0

## 2025-02-28 LAB
ANION GAP SERPL CALC-SCNC: 5 MMOL/L (ref 2–12)
BUN SERPL-MCNC: 9 MG/DL (ref 6–20)
BUN/CREAT SERPL: 12 (ref 12–20)
CALCIUM SERPL-MCNC: 9.4 MG/DL (ref 8.5–10.1)
CHLORIDE SERPL-SCNC: 104 MMOL/L (ref 97–108)
CO2 SERPL-SCNC: 28 MMOL/L (ref 21–32)
CREAT SERPL-MCNC: 0.76 MG/DL (ref 0.55–1.02)
GLUCOSE SERPL-MCNC: 86 MG/DL (ref 65–100)
POTASSIUM SERPL-SCNC: 4.4 MMOL/L (ref 3.5–5.1)
SODIUM SERPL-SCNC: 137 MMOL/L (ref 136–145)

## 2025-03-07 ENCOUNTER — PATIENT MESSAGE (OUTPATIENT)
Facility: CLINIC | Age: 25
End: 2025-03-07

## 2025-03-10 NOTE — PROGRESS NOTES
Family Medicine Follow-Up Progress Note   [facility]    Patient Payal Chan  2000, 24 y.o., female  Encounter Date 03/10/25    ASSESSMENT AND PLAN:     Assessment & Plan      Assessment & Plan  Tachycardia  Stable. EKG results normal. Pulse normal today though elevated at prior appts.  Reports episodes of elevated heart rate, particularly bothersome at night over the past week, often with chest pain. Tried to monitor heart rate with watch but had difficulties. Episodes occur a few times a week, with 3-4 instances last week but none last night. Recalls extreme fatigue on Friday night, followed by uninterrupted sleep. No previous episodes of waking due to chest pain. Does not consume caffeine and has not started new medications or supplements. No prior cardiac evaluations, including Holter monitoring. Current symptoms differ from usual anxiety, with no panic attacks or racing thoughts.  - Order Holter monitor for further evaluation.  - Contact patient regarding the fitting of the device.  - Data collection will span several days.  - Instruct to inform if no communication within a week.    Orders:    AMB POC EKG ROUTINE W/ 12 LEADS, SCREEN (-INITIAL PREV EXAM)    Palpitations  - Patient will be contacted regarding the Holter monitor fitting.  - Instructed to inform if no communication within a week.    Orders:    Cardiac holter monitor (1 day-2 day); Future    Anxiety  Managed. Better than 3 years ago. Takes sleep medication for anxiety-related sleep disturbances. History of anxiety-related vomiting and hives, severe after aunt's death 3 years ago. Managing anxiety better after 2 years of therapy.             No orders of the defined types were placed in this encounter.            No follow-ups on file.    There are no Patient Instructions on file for this visit.    Subjective:     No chief complaint on file.      History of Present Illness  The patient presents for evaluation of

## 2025-03-11 ENCOUNTER — OFFICE VISIT (OUTPATIENT)
Facility: CLINIC | Age: 25
End: 2025-03-11

## 2025-03-11 VITALS
SYSTOLIC BLOOD PRESSURE: 116 MMHG | OXYGEN SATURATION: 98 % | HEART RATE: 87 BPM | HEIGHT: 63 IN | RESPIRATION RATE: 20 BRPM | TEMPERATURE: 97.6 F | WEIGHT: 221 LBS | BODY MASS INDEX: 39.16 KG/M2 | DIASTOLIC BLOOD PRESSURE: 82 MMHG

## 2025-03-11 DIAGNOSIS — F41.9 ANXIETY: ICD-10-CM

## 2025-03-11 DIAGNOSIS — R00.0 TACHYCARDIA: Primary | ICD-10-CM

## 2025-03-11 DIAGNOSIS — R00.2 PALPITATIONS: ICD-10-CM

## 2025-03-11 ASSESSMENT — PATIENT HEALTH QUESTIONNAIRE - PHQ9
3. TROUBLE FALLING OR STAYING ASLEEP: NOT AT ALL
4. FEELING TIRED OR HAVING LITTLE ENERGY: NOT AT ALL
10. IF YOU CHECKED OFF ANY PROBLEMS, HOW DIFFICULT HAVE THESE PROBLEMS MADE IT FOR YOU TO DO YOUR WORK, TAKE CARE OF THINGS AT HOME, OR GET ALONG WITH OTHER PEOPLE: NOT DIFFICULT AT ALL
SUM OF ALL RESPONSES TO PHQ QUESTIONS 1-9: 0
6. FEELING BAD ABOUT YOURSELF - OR THAT YOU ARE A FAILURE OR HAVE LET YOURSELF OR YOUR FAMILY DOWN: NOT AT ALL
5. POOR APPETITE OR OVEREATING: NOT AT ALL
SUM OF ALL RESPONSES TO PHQ QUESTIONS 1-9: 0
9. THOUGHTS THAT YOU WOULD BE BETTER OFF DEAD, OR OF HURTING YOURSELF: NOT AT ALL
8. MOVING OR SPEAKING SO SLOWLY THAT OTHER PEOPLE COULD HAVE NOTICED. OR THE OPPOSITE, BEING SO FIGETY OR RESTLESS THAT YOU HAVE BEEN MOVING AROUND A LOT MORE THAN USUAL: NOT AT ALL
SUM OF ALL RESPONSES TO PHQ QUESTIONS 1-9: 0
SUM OF ALL RESPONSES TO PHQ QUESTIONS 1-9: 0
2. FEELING DOWN, DEPRESSED OR HOPELESS: NOT AT ALL
7. TROUBLE CONCENTRATING ON THINGS, SUCH AS READING THE NEWSPAPER OR WATCHING TELEVISION: NOT AT ALL
1. LITTLE INTEREST OR PLEASURE IN DOING THINGS: NOT AT ALL

## 2025-03-11 NOTE — PROGRESS NOTES
Chief Complaint   Patient presents with    Palpitations     Pt states heart rate has continued to happen, waking her up in the night as well      \"Have you been to the ER, urgent care clinic since your last visit?  Hospitalized since your last visit?\"    NO    “Have you seen or consulted any other health care providers outside of [facility] since your last visit?”    NO            Click Here for Release of Records Request

## 2025-03-11 NOTE — ASSESSMENT & PLAN NOTE
Managed. Better than 3 years ago. Takes sleep medication for anxiety-related sleep disturbances. History of anxiety-related vomiting and hives, severe after aunt's death 3 years ago. Managing anxiety better after 2 years of therapy.

## 2025-03-18 ENCOUNTER — HOSPITAL ENCOUNTER (OUTPATIENT)
Facility: HOSPITAL | Age: 25
Discharge: HOME | End: 2025-03-20
Payer: MEDICAID

## 2025-03-18 DIAGNOSIS — R00.2 PALPITATIONS: ICD-10-CM

## 2025-03-18 PROCEDURE — 93225 XTRNL ECG REC<48 HRS REC: CPT

## 2025-03-25 ENCOUNTER — PATIENT MESSAGE (OUTPATIENT)
Facility: CLINIC | Age: 25
End: 2025-03-25

## 2025-03-26 ENCOUNTER — RESULTS FOLLOW-UP (OUTPATIENT)
Facility: CLINIC | Age: 25
End: 2025-03-26

## 2025-03-26 NOTE — RESULT ENCOUNTER NOTE
Good news here that the holter monitor looked good overall. Your heart rate stayed in a pretty expected range. You had minimal PVCs (extra heartbeats). No concerning rhythm changes. How are you feeling?   Many thanks, Anh BERNARD-BC

## 2025-04-24 ENCOUNTER — OFFICE VISIT (OUTPATIENT)
Facility: CLINIC | Age: 25
End: 2025-04-24
Payer: COMMERCIAL

## 2025-04-24 VITALS
HEIGHT: 63 IN | DIASTOLIC BLOOD PRESSURE: 79 MMHG | WEIGHT: 225.3 LBS | OXYGEN SATURATION: 96 % | RESPIRATION RATE: 16 BRPM | HEART RATE: 87 BPM | TEMPERATURE: 98.1 F | BODY MASS INDEX: 39.92 KG/M2 | SYSTOLIC BLOOD PRESSURE: 116 MMHG

## 2025-04-24 DIAGNOSIS — R00.0 TACHYCARDIA: ICD-10-CM

## 2025-04-24 DIAGNOSIS — R00.2 PALPITATIONS: Primary | ICD-10-CM

## 2025-04-24 DIAGNOSIS — F41.9 ANXIETY: ICD-10-CM

## 2025-04-24 DIAGNOSIS — F51.04 PSYCHOPHYSIOLOGICAL INSOMNIA: ICD-10-CM

## 2025-04-24 PROCEDURE — 99213 OFFICE O/P EST LOW 20 MIN: CPT | Performed by: FAMILY MEDICINE

## 2025-04-24 RX ORDER — TRAZODONE HYDROCHLORIDE 50 MG/1
50 TABLET ORAL DAILY PRN
Qty: 90 TABLET | Refills: 3 | Status: SHIPPED | OUTPATIENT
Start: 2025-04-24

## 2025-04-24 NOTE — PATIENT INSTRUCTIONS
Please continue to make positive lifestyle changes to support your health and well-being. Daily exercise will help your body process carbs. Biking, stretching, dancing, and walking are great activities - even parking your car farther away at the grocery store or taking the stairs help! Along with that, make sure you are eating lean protein like chicken and fish (if vegetarian, lentils are excellent!) as well as lots of fresh vegetables and low sugar fruits such as blueberries, raspberries, blackberries. Diet and exercise also help to boost your immune system!

## 2025-04-24 NOTE — ASSESSMENT & PLAN NOTE
Patient requesting refill.  Does not use nightly    Orders:    traZODone (DESYREL) 50 MG tablet; Take 1 tablet by mouth daily as needed for Sleep

## 2025-04-24 NOTE — ASSESSMENT & PLAN NOTE
Currently high related to financial concerns.  Taking Lexapro 20 mg which she feels is working relatively well for her.  Reviewed supportive care.  Denies SI/HI.

## 2025-04-24 NOTE — PROGRESS NOTES
Family Medicine Follow-Up Progress Note   [facility]    Patient Payal Chan  2000, 24 y.o., female  Encounter Date 04/24/25    ASSESSMENT AND PLAN:     Assessment & Plan      Assessment & Plan  Palpitations  Reviewed Holter monitor notes, she reports stable, no new or worsening symptoms.  Will continue to monitor.         Anxiety  Currently high related to financial concerns.  Taking Lexapro 20 mg which she feels is working relatively well for her.  Reviewed supportive care.  Denies SI/HI.         Tachycardia  Heart rate well-controlled today!  Status post Holter monitor.  Will continue to monitor.         Psychophysiological insomnia  Patient requesting refill.  Does not use nightly    Orders:    traZODone (DESYREL) 50 MG tablet; Take 1 tablet by mouth daily as needed for Sleep      Orders Placed This Encounter    traZODone (DESYREL) 50 MG tablet     Sig: Take 1 tablet by mouth daily as needed for Sleep     Dispense:  90 tablet     Refill:  3        Follow-up and Dispositions    Return in about 1 year (around 4/24/2026) for Physical - offer me or Dr. JACKSON.          Return in about 1 year (around 4/24/2026) for Physical - offer me or Dr. JACKSON.    Patient Instructions   Please continue to make positive lifestyle changes to support your health and well-being. Daily exercise will help your body process carbs. Biking, stretching, dancing, and walking are great activities - even parking your car farther away at the grocery store or taking the stairs help! Along with that, make sure you are eating lean protein like chicken and fish (if vegetarian, lentils are excellent!) as well as lots of fresh vegetables and low sugar fruits such as blueberries, raspberries, blackberries. Diet and exercise also help to boost your immune system!     Subjective:     Chief Complaint   Patient presents with    Anxiety     Payal Chan is a 24 y.o. female who presents for a follow up on anxiety.        History

## 2025-04-24 NOTE — PROGRESS NOTES
Chief Complaint   Patient presents with    Anxiety     Payal Chan is a 24 y.o. female who presents for a follow up on anxiety.      \"Have you been to the ER, urgent care clinic since your last visit?  Hospitalized since your last visit?\"    NO    “Have you seen or consulted any other health care providers outside of [facility] since your last visit?”    NO            Click Here for Release of Records Request

## 2025-04-28 ENCOUNTER — OFFICE VISIT (OUTPATIENT)
Age: 25
End: 2025-04-28

## 2025-04-28 ENCOUNTER — PATIENT MESSAGE (OUTPATIENT)
Facility: CLINIC | Age: 25
End: 2025-04-28

## 2025-04-28 VITALS
HEIGHT: 63 IN | RESPIRATION RATE: 18 BRPM | SYSTOLIC BLOOD PRESSURE: 118 MMHG | WEIGHT: 228 LBS | HEART RATE: 111 BPM | TEMPERATURE: 99.1 F | DIASTOLIC BLOOD PRESSURE: 82 MMHG | BODY MASS INDEX: 40.4 KG/M2 | OXYGEN SATURATION: 95 %

## 2025-04-28 DIAGNOSIS — M79.671 RIGHT FOOT PAIN: Primary | ICD-10-CM

## 2025-04-28 NOTE — ASSESSMENT & PLAN NOTE
Plantar aspect right foot, near first and second toe there is evidence of prior puncture wound from foreign object. No definitive evidence of foreign object remains, however, so will defer any surgical exploration at this time. Patient is recommended to find callous cushions to relieve pressure from walking, and to cont to monitor for self-resolution.Red flag signs and strict return / ER precautions were discussed, and the patient expressed understanding and agreement to the plan. All questions were answered.

## 2025-04-28 NOTE — PROGRESS NOTES
Payal Chan (:  2000) is a 24 y.o. female,Established patient, here for evaluation of the following chief complaint(s):  Other (Patient stepped on toothpick last night, patient removed toothpick but still having pain and swelling in right foot.)         Assessment & Plan  Right foot pain    Plantar aspect right foot, near first and second toe there is evidence of prior puncture wound from foreign object. No definitive evidence of foreign object remains, however, so will defer any surgical exploration at this time. Patient is recommended to find callous cushions to relieve pressure from walking, and to cont to monitor for self-resolution. Red flag signs and strict return / ER precautions were discussed, and the patient expressed understanding and agreement to the plan. All questions were answered.            Return if symptoms worsen or fail to improve.       Subjective   Patient stepped on toothpick last night, patient removed toothpick but still having pain and swelling in right foot.      History provided by:  Patient   used: No        Review of Systems   All other systems reviewed and are negative.         Objective   Physical Exam  Musculoskeletal:        Feet:    Feet:      Comments: Small area of prior puncture wound without evidence of infection or foreign body in the above location.           An electronic signature was used to authenticate this note.    --Mir Pinedo DO